# Patient Record
Sex: FEMALE | Race: WHITE | Employment: OTHER | URBAN - METROPOLITAN AREA
[De-identification: names, ages, dates, MRNs, and addresses within clinical notes are randomized per-mention and may not be internally consistent; named-entity substitution may affect disease eponyms.]

---

## 2018-02-16 ENCOUNTER — OFFICE VISIT (OUTPATIENT)
Dept: OBGYN CLINIC | Age: 68
End: 2018-02-16
Payer: MEDICARE

## 2018-02-16 VITALS
DIASTOLIC BLOOD PRESSURE: 80 MMHG | WEIGHT: 208 LBS | HEIGHT: 68 IN | BODY MASS INDEX: 31.52 KG/M2 | SYSTOLIC BLOOD PRESSURE: 124 MMHG

## 2018-02-16 DIAGNOSIS — Z01.419 WELL WOMAN EXAM WITH ROUTINE GYNECOLOGICAL EXAM: Primary | ICD-10-CM

## 2018-02-16 DIAGNOSIS — Z11.51 SCREENING FOR HPV (HUMAN PAPILLOMAVIRUS): ICD-10-CM

## 2018-02-16 DIAGNOSIS — N89.8 VAGINAL ODOR: ICD-10-CM

## 2018-02-16 DIAGNOSIS — R87.612 LGSIL OF CERVIX OF UNDETERMINED SIGNIFICANCE: ICD-10-CM

## 2018-02-16 DIAGNOSIS — N89.8 VAGINAL ITCHING: ICD-10-CM

## 2018-02-16 PROCEDURE — 99214 OFFICE O/P EST MOD 30 MIN: CPT | Performed by: OBSTETRICS & GYNECOLOGY

## 2018-02-16 RX ORDER — CLOBETASOL PROPIONATE 0.5 MG/G
OINTMENT TOPICAL
Qty: 1 TUBE | Refills: 5 | Status: SHIPPED | OUTPATIENT
Start: 2018-02-16

## 2018-02-26 DIAGNOSIS — N89.8 VAGINAL ITCHING: ICD-10-CM

## 2018-02-26 DIAGNOSIS — N89.8 VAGINAL ODOR: ICD-10-CM

## 2018-02-26 DIAGNOSIS — Z11.51 SCREENING FOR HPV (HUMAN PAPILLOMAVIRUS): ICD-10-CM

## 2018-02-26 DIAGNOSIS — R87.612 LGSIL OF CERVIX OF UNDETERMINED SIGNIFICANCE: ICD-10-CM

## 2018-02-26 DIAGNOSIS — Z01.419 WELL WOMAN EXAM WITH ROUTINE GYNECOLOGICAL EXAM: ICD-10-CM

## 2018-02-27 ENCOUNTER — TELEPHONE (OUTPATIENT)
Dept: OBGYN CLINIC | Age: 68
End: 2018-02-27

## 2018-03-13 ENCOUNTER — PROCEDURE VISIT (OUTPATIENT)
Dept: OBGYN CLINIC | Age: 68
End: 2018-03-13
Payer: MEDICARE

## 2018-03-13 DIAGNOSIS — R87.612 LGSIL OF CERVIX OF UNDETERMINED SIGNIFICANCE: Primary | ICD-10-CM

## 2018-03-13 PROCEDURE — 99999 PR OFFICE/OUTPT VISIT,PROCEDURE ONLY: CPT | Performed by: OBSTETRICS & GYNECOLOGY

## 2018-03-13 PROCEDURE — 57454 BX/CURETT OF CERVIX W/SCOPE: CPT | Performed by: OBSTETRICS & GYNECOLOGY

## 2018-09-06 ENCOUNTER — OFFICE VISIT (OUTPATIENT)
Dept: OBGYN CLINIC | Age: 68
End: 2018-09-06
Payer: MEDICARE

## 2018-09-06 VITALS
DIASTOLIC BLOOD PRESSURE: 78 MMHG | SYSTOLIC BLOOD PRESSURE: 126 MMHG | BODY MASS INDEX: 31.22 KG/M2 | WEIGHT: 206 LBS | HEIGHT: 68 IN

## 2018-09-06 DIAGNOSIS — R87.612 LGSIL OF CERVIX OF UNDETERMINED SIGNIFICANCE: Primary | ICD-10-CM

## 2018-09-06 DIAGNOSIS — B97.7 HPV IN FEMALE: ICD-10-CM

## 2018-09-06 PROCEDURE — 99213 OFFICE O/P EST LOW 20 MIN: CPT | Performed by: OBSTETRICS & GYNECOLOGY

## 2018-09-18 DIAGNOSIS — B97.7 HPV IN FEMALE: ICD-10-CM

## 2018-09-18 DIAGNOSIS — R87.612 LGSIL OF CERVIX OF UNDETERMINED SIGNIFICANCE: ICD-10-CM

## 2019-03-11 ENCOUNTER — OFFICE VISIT (OUTPATIENT)
Dept: OBGYN CLINIC | Age: 69
End: 2019-03-11
Payer: MEDICARE

## 2019-03-11 VITALS
SYSTOLIC BLOOD PRESSURE: 94 MMHG | HEIGHT: 68 IN | DIASTOLIC BLOOD PRESSURE: 68 MMHG | BODY MASS INDEX: 32.43 KG/M2 | WEIGHT: 214 LBS

## 2019-03-11 DIAGNOSIS — Z01.419 WELL WOMAN EXAM WITH ROUTINE GYNECOLOGICAL EXAM: Primary | ICD-10-CM

## 2019-03-11 DIAGNOSIS — B97.7 HPV IN FEMALE: ICD-10-CM

## 2019-03-11 DIAGNOSIS — R87.612 LGSIL OF CERVIX OF UNDETERMINED SIGNIFICANCE: ICD-10-CM

## 2019-03-11 DIAGNOSIS — Z11.51 SCREENING FOR HPV (HUMAN PAPILLOMAVIRUS): ICD-10-CM

## 2019-03-11 PROCEDURE — 99397 PER PM REEVAL EST PAT 65+ YR: CPT | Performed by: OBSTETRICS & GYNECOLOGY

## 2019-03-11 ASSESSMENT — PATIENT HEALTH QUESTIONNAIRE - PHQ9
1. LITTLE INTEREST OR PLEASURE IN DOING THINGS: 0
SUM OF ALL RESPONSES TO PHQ QUESTIONS 1-9: 0
SUM OF ALL RESPONSES TO PHQ9 QUESTIONS 1 & 2: 0
2. FEELING DOWN, DEPRESSED OR HOPELESS: 0
SUM OF ALL RESPONSES TO PHQ QUESTIONS 1-9: 0

## 2019-03-22 ENCOUNTER — TELEPHONE (OUTPATIENT)
Dept: OBGYN CLINIC | Age: 69
End: 2019-03-22

## 2019-03-22 DIAGNOSIS — B97.7 HPV IN FEMALE: ICD-10-CM

## 2019-03-22 DIAGNOSIS — Z01.419 WELL WOMAN EXAM WITH ROUTINE GYNECOLOGICAL EXAM: ICD-10-CM

## 2019-03-22 DIAGNOSIS — Z11.51 SCREENING FOR HPV (HUMAN PAPILLOMAVIRUS): ICD-10-CM

## 2019-03-22 DIAGNOSIS — R87.612 LGSIL OF CERVIX OF UNDETERMINED SIGNIFICANCE: ICD-10-CM

## 2019-04-10 ENCOUNTER — PROCEDURE VISIT (OUTPATIENT)
Dept: OBGYN CLINIC | Age: 69
End: 2019-04-10
Payer: MEDICARE

## 2019-04-10 VITALS
BODY MASS INDEX: 32.28 KG/M2 | DIASTOLIC BLOOD PRESSURE: 70 MMHG | SYSTOLIC BLOOD PRESSURE: 116 MMHG | WEIGHT: 213 LBS | HEIGHT: 68 IN

## 2019-04-10 DIAGNOSIS — R87.612 LGSIL OF CERVIX OF UNDETERMINED SIGNIFICANCE: Primary | ICD-10-CM

## 2019-04-10 PROCEDURE — 57456 ENDOCERV CURETTAGE W/SCOPE: CPT | Performed by: OBSTETRICS & GYNECOLOGY

## 2019-04-10 NOTE — PROGRESS NOTES
Procedure Note  Informed Consent:  Pt was informed of the pap smear results. Discussion of risks/options/ alternatives re.colposcopy were  presented to the pt. Discussion of the expectations and limitations of the procedure were reviewed. Risks are dependent on the extensiveness of the procedure and include, but are not limited to:bleeding,infection,insufficient sampling or visualization to complete the study and obtain a diagnosis. In addition,  procedures were reviewed. Possible cervical biopsy and endocervical biopsy were reviewed. In addition, possible endometrial biopsy was reviewed. Pt apparently understands and questions were answered to her satisfaction. Concerns were addressed. Pt gives verbal consent to the procedure    Indications:   Pap history: LGSIL HR and HPV 16 positive 3/19. PT with long standing h/o dysplasia. Pt with h/o hysterectomy. Procedure:    Pt was positioned in the dorsal lithotomy position without difficulty. Speculum was inserted without difficulty. Vagina was adequately visualized with speculum and colposcope. Findings:   Atrophic changes, no acetowhite lesions noted. Biopsies:  Vag cuff - cytobrushes x 3    Review of findings and possible follow up were reviewed. Pt was advised to refrain from vaginal intercourse for 2Days. Pt was advised to expect discharge from 2 to 7 days. Pt advised to call office if increase in bleeding , temp elevation, or change in discharge. If pt experiences pelvic pain not relieved with OTC analgesia pt advised to call office.     Follow up:  Pt to return in 6mos for repeat pap

## 2019-04-10 NOTE — PROGRESS NOTES
Chaperone for Intimate Exam    1. Was chaperone offered as part of the rooming process? offered, declined     A timeout was performed immediately prior to the start of the Colposcopy procedure and included the correct patient (two identifiers), correct procedure and correct site(s). Procedure consent and allergies were also verified.

## 2019-09-26 ENCOUNTER — OFFICE VISIT (OUTPATIENT)
Dept: OBGYN CLINIC | Age: 69
End: 2019-09-26
Payer: MEDICARE

## 2019-09-26 VITALS
BODY MASS INDEX: 32.43 KG/M2 | WEIGHT: 214 LBS | HEIGHT: 68 IN | SYSTOLIC BLOOD PRESSURE: 130 MMHG | DIASTOLIC BLOOD PRESSURE: 76 MMHG

## 2019-09-26 DIAGNOSIS — N94.9 VAGINAL BURNING: ICD-10-CM

## 2019-09-26 DIAGNOSIS — N89.8 VAGINAL ITCHING: Primary | ICD-10-CM

## 2019-09-26 LAB
BILIRUBIN, POC: NORMAL
BLOOD URINE, POC: NORMAL
CLARITY, POC: CLEAR
COLOR, POC: NORMAL
GLUCOSE URINE, POC: NORMAL
KETONES, POC: NORMAL
LEUKOCYTE EST, POC: NORMAL
NITRITE, POC: NORMAL
PH, POC: 6
PROTEIN, POC: NORMAL
SPECIFIC GRAVITY, POC: 1.02
UROBILINOGEN, POC: NORMAL

## 2019-09-26 PROCEDURE — 81003 URINALYSIS AUTO W/O SCOPE: CPT | Performed by: OBSTETRICS & GYNECOLOGY

## 2019-09-26 PROCEDURE — 99213 OFFICE O/P EST LOW 20 MIN: CPT | Performed by: OBSTETRICS & GYNECOLOGY

## 2019-09-26 RX ORDER — CLOBETASOL PROPIONATE 0.5 MG/G
OINTMENT TOPICAL
Qty: 1 TUBE | Refills: 2 | Status: SHIPPED | OUTPATIENT
Start: 2019-09-26 | End: 2021-06-03

## 2019-10-24 ENCOUNTER — OFFICE VISIT (OUTPATIENT)
Dept: OBGYN CLINIC | Age: 69
End: 2019-10-24
Payer: MEDICARE

## 2019-10-24 VITALS — BODY MASS INDEX: 32.23 KG/M2 | WEIGHT: 212 LBS | SYSTOLIC BLOOD PRESSURE: 110 MMHG | DIASTOLIC BLOOD PRESSURE: 64 MMHG

## 2019-10-24 DIAGNOSIS — R87.612 LGSIL ON PAP SMEAR OF CERVIX: Primary | ICD-10-CM

## 2019-10-24 DIAGNOSIS — B97.7 HPV IN FEMALE: ICD-10-CM

## 2019-10-24 PROCEDURE — 99213 OFFICE O/P EST LOW 20 MIN: CPT | Performed by: OBSTETRICS & GYNECOLOGY

## 2019-10-24 RX ORDER — NYSTATIN 100000 U/G
CREAM TOPICAL
Qty: 1 TUBE | Refills: 1 | Status: SHIPPED | OUTPATIENT
Start: 2019-10-24

## 2019-10-24 RX ORDER — PREDNISONE 10 MG/1
TABLET ORAL
COMMUNITY
Start: 2019-10-21

## 2019-11-05 DIAGNOSIS — R87.612 LGSIL ON PAP SMEAR OF CERVIX: ICD-10-CM

## 2019-11-05 DIAGNOSIS — B97.7 HPV IN FEMALE: ICD-10-CM

## 2020-06-29 ENCOUNTER — OFFICE VISIT (OUTPATIENT)
Dept: OBGYN CLINIC | Age: 70
End: 2020-06-29
Payer: MEDICARE

## 2020-06-29 VITALS
WEIGHT: 208 LBS | HEIGHT: 68 IN | BODY MASS INDEX: 31.52 KG/M2 | DIASTOLIC BLOOD PRESSURE: 80 MMHG | SYSTOLIC BLOOD PRESSURE: 124 MMHG

## 2020-06-29 PROCEDURE — 99213 OFFICE O/P EST LOW 20 MIN: CPT | Performed by: OBSTETRICS & GYNECOLOGY

## 2020-06-29 ASSESSMENT — PATIENT HEALTH QUESTIONNAIRE - PHQ9
1. LITTLE INTEREST OR PLEASURE IN DOING THINGS: 0
SUM OF ALL RESPONSES TO PHQ QUESTIONS 1-9: 0
2. FEELING DOWN, DEPRESSED OR HOPELESS: 0
SUM OF ALL RESPONSES TO PHQ9 QUESTIONS 1 & 2: 0
SUM OF ALL RESPONSES TO PHQ QUESTIONS 1-9: 0

## 2020-09-11 ENCOUNTER — TELEPHONE (OUTPATIENT)
Dept: OBGYN CLINIC | Age: 70
End: 2020-09-11

## 2020-09-23 ENCOUNTER — PROCEDURE VISIT (OUTPATIENT)
Dept: OBGYN CLINIC | Age: 70
End: 2020-09-23
Payer: MEDICARE

## 2020-09-23 VITALS
BODY MASS INDEX: 31.67 KG/M2 | WEIGHT: 209 LBS | HEIGHT: 68 IN | SYSTOLIC BLOOD PRESSURE: 128 MMHG | DIASTOLIC BLOOD PRESSURE: 82 MMHG

## 2020-09-23 DIAGNOSIS — R87.612 LGSIL OF CERVIX OF UNDETERMINED SIGNIFICANCE: ICD-10-CM

## 2020-09-23 PROCEDURE — 57421 EXAM/BIOPSY OF VAG W/SCOPE: CPT | Performed by: OBSTETRICS & GYNECOLOGY

## 2020-09-23 NOTE — PROGRESS NOTES
Procedure Note  Informed Consent:  Pt was informed of the pap smear results. Discussion of risks/options/ alternatives re.colposcopy were  presented to the pt. Discussion of the expectations and limitations of the procedure were reviewed. Risks are dependent on the extensiveness of the procedure and include, but are not limited to:bleeding,infection,insufficient sampling or visualization to complete the study and obtain a diagnosis. In addition,  procedures were reviewed. Possible cervical biopsy and endocervical biopsy were reviewed. In addition, possible endometrial biopsy was reviewed. Pt apparently understands and questions were answered to her satisfaction. Concerns were addressed. Pt gives verbal consent to the procedure    Indications:   Pap history: LGSIL cannot exclude HGSIL HR and HPV 16 positive, pt with long standing h/o abn paps and pt with h/o hysterectomy. Procedure:    Pt was positioned in the dorsal lithotomy position without difficulty. Speculum was inserted without difficulty. Cervix  was Prepped With 3% acetic acid. Transformation zone was adequately visualized with speculum and colposcope. Findings:   Well healed cuff, atrophic changes, no acetowhite lesions    Biopsies:  Vag cuff - cytobrushes x 3  Excellent hemostasis was noted. Review of findings and possible follow up were reviewed. Pt was advised to refrain from vaginal intercourse for 2Days. Pt was advised to expect discharge from 2 to 7 days. Pt advised to call office if increase in bleeding , temp elevation, or change in discharge. If pt experiences pelvic pain not relieved with OTC analgesia pt advised to call office.     Follow up:  Pt to return in 6mos for repeat pap

## 2020-10-30 ENCOUNTER — TELEPHONE (OUTPATIENT)
Dept: OBGYN CLINIC | Age: 70
End: 2020-10-30

## 2021-04-27 ENCOUNTER — OFFICE VISIT (OUTPATIENT)
Dept: OBGYN CLINIC | Age: 71
End: 2021-04-27
Payer: MEDICARE

## 2021-04-27 VITALS
SYSTOLIC BLOOD PRESSURE: 128 MMHG | HEIGHT: 68 IN | DIASTOLIC BLOOD PRESSURE: 60 MMHG | WEIGHT: 208 LBS | BODY MASS INDEX: 31.52 KG/M2

## 2021-04-27 DIAGNOSIS — R87.612 LGSIL OF CERVIX OF UNDETERMINED SIGNIFICANCE: Primary | ICD-10-CM

## 2021-04-27 DIAGNOSIS — Z12.72 ENCOUNTER FOR SCREENING FOR MALIGNANT NEOPLASM OF VAGINA: ICD-10-CM

## 2021-04-27 PROCEDURE — 99213 OFFICE O/P EST LOW 20 MIN: CPT | Performed by: OBSTETRICS & GYNECOLOGY

## 2021-04-27 RX ORDER — ASPIRIN 325 MG
325 TABLET ORAL 2 TIMES DAILY
COMMUNITY

## 2021-04-27 RX ORDER — OMEPRAZOLE 40 MG/1
CAPSULE, DELAYED RELEASE ORAL
COMMUNITY
Start: 2021-02-15

## 2021-04-27 ASSESSMENT — PATIENT HEALTH QUESTIONNAIRE - PHQ9
1. LITTLE INTEREST OR PLEASURE IN DOING THINGS: 0
2. FEELING DOWN, DEPRESSED OR HOPELESS: 0

## 2021-04-27 NOTE — PROGRESS NOTES
SUBJECTIVE:   65 y.o.  female here for repeat pap. Pt with h/o abnormal pap 7/20 LGSIL HR HPV Positive and colposcopy 9/20 LGSIL HR HPV positive for evaluation. Pt without complaints today. Review of Systems:  General ROS: negative  Psychological ROS: negative  ENT ROS: negative  Endocrine ROS: negative  Respiratory ROS: no cough, shortness of breath, or wheezing  Cardiovascular ROS: no chest pain or dyspnea on exertion  Gastrointestinal ROS: no abdominal pain, change in bowel habits, or black or bloody stools  Genito-Urinary ROS: no dysuria, trouble voiding, or hematuria  Musculoskeletal ROS: negative  Neurological ROS: no TIA or stroke symptoms  Dermatological ROS: negative    OBJECTIVE:   /60   Ht 5' 8\" (1.727 m)   Wt 208 lb (94.3 kg)   LMP 01/01/2011 (Approximate)   BMI 31.63 kg/m²     Physical Exam:  CONSTITUTIONAL: She appears well nourished and developed   NEUROLOGIC: Alert and oriented to time, place and person  LUNGS: Clear to ascultation bilaterally  CVS: regular rate and rhythm  LYMPHATIC: No palpable lymph nodes  ABDOMEN: benign, soft, nontender, no masses. No liver or splenic organomegaly. No evidence of abdominal or inguinal hernia. No indication for occult blood testing  SKIN: normal texture and tone, no lesions    Pelvic Exam:   EFG: normal external genitalia  URETHRAL MEATUS: normal size, no diverticula   URETHRA: normal appearing without diverticula or lesions  BLADDER:  No masses or tenderness  VAGINA: normal rugae, no discharge, well healed cuff, atrophic changes  CERVIX: absent  PERINEUM: normal appearing without lesions or masses    ASSESSMENT:   LGSIL HR HPV positive     PLAN:   Pap with hpv pending  Pt to f/u for repeat pap in 6mos.

## 2021-05-05 DIAGNOSIS — R87.612 LGSIL OF CERVIX OF UNDETERMINED SIGNIFICANCE: ICD-10-CM

## 2021-05-05 DIAGNOSIS — Z12.72 ENCOUNTER FOR SCREENING FOR MALIGNANT NEOPLASM OF VAGINA: ICD-10-CM

## 2021-05-10 ENCOUNTER — TELEPHONE (OUTPATIENT)
Dept: OBGYN CLINIC | Age: 71
End: 2021-05-10

## 2021-06-03 ENCOUNTER — PROCEDURE VISIT (OUTPATIENT)
Dept: OBGYN CLINIC | Age: 71
End: 2021-06-03
Payer: MEDICARE

## 2021-06-03 VITALS
DIASTOLIC BLOOD PRESSURE: 80 MMHG | WEIGHT: 205 LBS | HEIGHT: 68 IN | BODY MASS INDEX: 31.07 KG/M2 | SYSTOLIC BLOOD PRESSURE: 118 MMHG

## 2021-06-03 DIAGNOSIS — R87.613 HSIL (HIGH GRADE SQUAMOUS INTRAEPITHELIAL LESION) ON PAP SMEAR OF CERVIX: Primary | ICD-10-CM

## 2021-06-03 PROCEDURE — 57456 ENDOCERV CURETTAGE W/SCOPE: CPT | Performed by: OBSTETRICS & GYNECOLOGY

## 2021-06-03 NOTE — PROGRESS NOTES
A timeout was performed immediately prior to the start of the Colposcopy procedure and included the correct patient (two identifiers), correct procedure and correct site(s). Procedure consent and allergies were also verified. The patient was asked if she would like a chaperone present for her intimate exam. She  Declined the chaperone.  Isabelle Moran MA

## 2021-06-08 ENCOUNTER — TELEPHONE (OUTPATIENT)
Dept: OBGYN CLINIC | Age: 71
End: 2021-06-08

## 2021-06-08 DIAGNOSIS — R87.613 HSIL (HIGH GRADE SQUAMOUS INTRAEPITHELIAL LESION) ON PAP SMEAR OF CERVIX: Primary | ICD-10-CM

## 2021-06-09 ENCOUNTER — TELEPHONE (OUTPATIENT)
Dept: OBGYN CLINIC | Age: 71
End: 2021-06-09

## 2021-06-09 NOTE — TELEPHONE ENCOUNTER
Spoke to patient and made aware to contact her insurance company to see what gyn oncologist is covered in the area that she is requesting, and Dr Venita Gosselin can enter in a referral.

## 2021-06-09 NOTE — TELEPHONE ENCOUNTER
Pt is being referred to Dr. Kenney Kennedy. She would like to know if is there is a specialist in [place] or [place].

## 2022-06-13 ENCOUNTER — OFFICE VISIT (OUTPATIENT)
Dept: OBGYN CLINIC | Age: 72
End: 2022-06-13
Payer: MEDICARE

## 2022-06-13 VITALS
WEIGHT: 207 LBS | HEIGHT: 68 IN | SYSTOLIC BLOOD PRESSURE: 130 MMHG | BODY MASS INDEX: 31.37 KG/M2 | DIASTOLIC BLOOD PRESSURE: 88 MMHG

## 2022-06-13 DIAGNOSIS — Z12.31 ENCOUNTER FOR MAMMOGRAM TO ESTABLISH BASELINE MAMMOGRAM: ICD-10-CM

## 2022-06-13 DIAGNOSIS — R87.613 HSIL (HIGH GRADE SQUAMOUS INTRAEPITHELIAL LESION) ON PAP SMEAR OF CERVIX: Primary | ICD-10-CM

## 2022-06-13 DIAGNOSIS — Z12.72 ENCOUNTER FOR SCREENING FOR MALIGNANT NEOPLASM OF VAGINA: ICD-10-CM

## 2022-06-13 DIAGNOSIS — B97.7 HPV IN FEMALE: ICD-10-CM

## 2022-06-13 DIAGNOSIS — R87.612 LGSIL OF CERVIX OF UNDETERMINED SIGNIFICANCE: ICD-10-CM

## 2022-06-13 PROCEDURE — 99397 PER PM REEVAL EST PAT 65+ YR: CPT | Performed by: OBSTETRICS & GYNECOLOGY

## 2022-06-13 ASSESSMENT — PATIENT HEALTH QUESTIONNAIRE - PHQ9
SUM OF ALL RESPONSES TO PHQ QUESTIONS 1-9: 0
SUM OF ALL RESPONSES TO PHQ9 QUESTIONS 1 & 2: 0
SUM OF ALL RESPONSES TO PHQ QUESTIONS 1-9: 0
2. FEELING DOWN, DEPRESSED OR HOPELESS: 0
1. LITTLE INTEREST OR PLEASURE IN DOING THINGS: 0

## 2022-06-13 NOTE — PROGRESS NOTES
SUBJECTIVE:   66 y.o.  female here for annual exam. Pt  and no complaints today. PT with VAIN/HGSIL last year and pt seen and treated by Dr. Victor Manuel Alcala last year. Review of Systems:  General ROS: negative  Psychological ROS: negative  ENT ROS: negative  Endocrine ROS: negative  Respiratory ROS: no cough, shortness of breath, or wheezing  Cardiovascular ROS: no chest pain or dyspnea on exertion  Gastrointestinal ROS: no abdominal pain, change in bowel habits, or black or bloody stools  Genito-Urinary ROS: no dysuria, trouble voiding, or hematuria  Musculoskeletal ROS: negative  Neurological ROS: no TIA or stroke symptoms  Dermatological ROS: negative    OBJECTIVE:   /88   Ht 5' 8\" (1.727 m)   Wt 207 lb (93.9 kg)   LMP 2011 (Approximate)   BMI 31.47 kg/m²     Physical Exam:  CONSTITUTIONAL: She appears well nourished and developed   NEUROLOGIC: Alert and oriented to time, place and person  NECK: no thyroidmegaly  BREASTS: Bilateral breasts without masses, lesions or nipple discharge  LUNGS: Clear to ascultation bilaterally  CVS: regular rate and rhythm  LYMPHATIC: No palpable lymph nodes  ABDOMEN: benign, soft, nontender, no masses. No liver or splenic organomegaly. No evidence of abdominal or inguinal hernia. No indication for occult blood testing  SKIN: normal texture and tone, no lesions  NEURO: normal tone, no hyperreflexia, 1+DTRs throughout    Pelvic Exam:   EFG: normal external genitalia  URETHRAL MEATUS: normal size, no diverticula   URETHRA: normal appearing without diverticula or lesions  BLADDER:  No masses or tenderness  VAGINA: normal rugae, no discharge, atrophic changes  CERVIX: absent  UTERUS: absent  ADNEXA: normal adnexa in size, nontender and no masses. PERINEUM: normal appearing without lesions or masses  RECTUM: no hemorrhoids or rectal masses.      ASSESSMENT:   Routine gynecologic exam    PLAN:   Pap with hpv pending  MMG nml per pt  PT reports h/o colonoscopy and will f/u with PCP for repeat as needed  Past medical, social and family history reviewed and updated in pt's chart. Routine health screenings and precautions discussed.

## 2022-06-29 ENCOUNTER — TELEPHONE (OUTPATIENT)
Dept: OBGYN CLINIC | Age: 72
End: 2022-06-29

## 2022-06-29 DIAGNOSIS — R87.613 HSIL (HIGH GRADE SQUAMOUS INTRAEPITHELIAL LESION) ON PAP SMEAR OF CERVIX: Primary | ICD-10-CM

## 2022-06-29 NOTE — TELEPHONE ENCOUNTER
LM to make pt aware that she needs to schedule and apt with Dr Isa Pruitt for HGSIL. Please give call to Isabelle Roa.

## 2022-06-29 NOTE — TELEPHONE ENCOUNTER
Pt aware and will call Dr. Patricio Ardon to schedule apt. New referral and records have been faxed to Dr Patricio Ardon.

## 2023-03-13 DIAGNOSIS — E11.65 TYPE 2 DIABETES MELLITUS WITH HYPERGLYCEMIA, WITHOUT LONG-TERM CURRENT USE OF INSULIN (MULTI): ICD-10-CM

## 2023-03-13 RX ORDER — METFORMIN HYDROCHLORIDE 500 MG/1
500 TABLET ORAL
Qty: 180 TABLET | Refills: 1 | Status: SHIPPED | OUTPATIENT
Start: 2023-03-13 | End: 2023-09-06

## 2023-03-13 RX ORDER — METFORMIN HYDROCHLORIDE 500 MG/1
500 TABLET ORAL
COMMUNITY
Start: 2022-06-23 | End: 2023-03-13 | Stop reason: SDUPTHER

## 2023-03-14 RX ORDER — METFORMIN HYDROCHLORIDE 500 MG/1
TABLET ORAL
Qty: 60 TABLET | Refills: 0 | OUTPATIENT
Start: 2023-03-14

## 2023-03-22 PROBLEM — E78.5 HYPERLIPIDEMIA: Status: ACTIVE | Noted: 2023-03-22

## 2023-03-22 PROBLEM — E11.65 DIABETES MELLITUS WITH HYPERGLYCEMIA, WITHOUT LONG-TERM CURRENT USE OF INSULIN (MULTI): Status: ACTIVE | Noted: 2023-03-22

## 2023-03-22 PROBLEM — I10 BENIGN HYPERTENSION: Status: ACTIVE | Noted: 2023-03-22

## 2023-03-22 PROBLEM — R05.9 COUGH: Status: ACTIVE | Noted: 2023-03-22

## 2023-03-22 PROBLEM — F41.9 ANXIETY: Status: ACTIVE | Noted: 2023-03-22

## 2023-03-22 PROBLEM — G47.00 INSOMNIA, CONTROLLED: Status: ACTIVE | Noted: 2023-03-22

## 2023-03-22 PROBLEM — J00 COMMON COLD: Status: ACTIVE | Noted: 2023-03-22

## 2023-03-22 PROBLEM — J30.2 SEASONAL ALLERGIES: Status: ACTIVE | Noted: 2023-03-22

## 2023-03-22 PROBLEM — J45.909 ASTHMA, MILD (HHS-HCC): Status: ACTIVE | Noted: 2023-03-22

## 2023-03-22 PROBLEM — B00.9 HERPES SIMPLEX TYPE 2 INFECTION: Status: ACTIVE | Noted: 2023-03-22

## 2023-03-22 RX ORDER — ATORVASTATIN CALCIUM 10 MG/1
10 TABLET, FILM COATED ORAL DAILY
COMMUNITY
End: 2023-06-19 | Stop reason: SDUPTHER

## 2023-03-22 RX ORDER — ALBUTEROL SULFATE 90 UG/1
2 AEROSOL, METERED RESPIRATORY (INHALATION) EVERY 4 HOURS PRN
COMMUNITY
Start: 2023-02-20

## 2023-03-22 RX ORDER — FLUTICASONE PROPIONATE 50 MCG
SPRAY, SUSPENSION (ML) NASAL
COMMUNITY

## 2023-03-22 RX ORDER — MULTIVITAMIN
TABLET ORAL
COMMUNITY

## 2023-03-22 RX ORDER — CHOLECALCIFEROL (VITAMIN D3) 50 MCG
TABLET ORAL
COMMUNITY

## 2023-03-22 RX ORDER — LANCETS
EACH MISCELLANEOUS
COMMUNITY

## 2023-03-22 RX ORDER — BISOPROLOL FUMARATE AND HYDROCHLOROTHIAZIDE 5; 6.25 MG/1; MG/1
1 TABLET ORAL DAILY
COMMUNITY
End: 2023-06-19 | Stop reason: SDUPTHER

## 2023-03-22 RX ORDER — CITALOPRAM 20 MG/1
20 TABLET, FILM COATED ORAL DAILY
COMMUNITY
End: 2023-06-19 | Stop reason: SDUPTHER

## 2023-03-22 RX ORDER — PANTOPRAZOLE SODIUM 40 MG/1
1 TABLET, DELAYED RELEASE ORAL DAILY
COMMUNITY
Start: 2022-02-28

## 2023-03-22 RX ORDER — BENZONATATE 100 MG/1
100 CAPSULE ORAL EVERY 8 HOURS PRN
COMMUNITY
Start: 2023-01-29 | End: 2023-04-03 | Stop reason: ALTCHOICE

## 2023-03-22 RX ORDER — IBUPROFEN 200 MG
CAPSULE ORAL
COMMUNITY
End: 2023-12-05 | Stop reason: SDUPTHER

## 2023-03-24 ENCOUNTER — APPOINTMENT (OUTPATIENT)
Dept: PRIMARY CARE | Facility: CLINIC | Age: 73
End: 2023-03-24
Payer: MEDICARE

## 2023-04-03 ENCOUNTER — OFFICE VISIT (OUTPATIENT)
Dept: PRIMARY CARE | Facility: CLINIC | Age: 73
End: 2023-04-03
Payer: MEDICARE

## 2023-04-03 VITALS
HEART RATE: 64 BPM | BODY MASS INDEX: 28.34 KG/M2 | DIASTOLIC BLOOD PRESSURE: 80 MMHG | HEIGHT: 68 IN | WEIGHT: 187 LBS | SYSTOLIC BLOOD PRESSURE: 130 MMHG

## 2023-04-03 DIAGNOSIS — Z23 NEED FOR VACCINATION: ICD-10-CM

## 2023-04-03 DIAGNOSIS — Z00.00 ROUTINE GENERAL MEDICAL EXAMINATION AT HEALTH CARE FACILITY: Primary | ICD-10-CM

## 2023-04-03 DIAGNOSIS — R07.81 RIB PAIN ON RIGHT SIDE: ICD-10-CM

## 2023-04-03 PROCEDURE — 1160F RVW MEDS BY RX/DR IN RCRD: CPT | Performed by: FAMILY MEDICINE

## 2023-04-03 PROCEDURE — 90677 PCV20 VACCINE IM: CPT | Performed by: FAMILY MEDICINE

## 2023-04-03 PROCEDURE — G0439 PPPS, SUBSEQ VISIT: HCPCS | Performed by: FAMILY MEDICINE

## 2023-04-03 PROCEDURE — 1159F MED LIST DOCD IN RCRD: CPT | Performed by: FAMILY MEDICINE

## 2023-04-03 PROCEDURE — 3079F DIAST BP 80-89 MM HG: CPT | Performed by: FAMILY MEDICINE

## 2023-04-03 PROCEDURE — G0009 ADMIN PNEUMOCOCCAL VACCINE: HCPCS | Performed by: FAMILY MEDICINE

## 2023-04-03 PROCEDURE — 99213 OFFICE O/P EST LOW 20 MIN: CPT | Performed by: FAMILY MEDICINE

## 2023-04-03 PROCEDURE — 3075F SYST BP GE 130 - 139MM HG: CPT | Performed by: FAMILY MEDICINE

## 2023-04-03 PROCEDURE — 1170F FXNL STATUS ASSESSED: CPT | Performed by: FAMILY MEDICINE

## 2023-04-03 PROCEDURE — 1123F ACP DISCUSS/DSCN MKR DOCD: CPT | Performed by: FAMILY MEDICINE

## 2023-04-03 PROCEDURE — 1036F TOBACCO NON-USER: CPT | Performed by: FAMILY MEDICINE

## 2023-04-03 RX ORDER — ESTRADIOL 0.1 MG/G
2 CREAM VAGINAL DAILY
COMMUNITY
End: 2023-12-05 | Stop reason: WASHOUT

## 2023-04-03 ASSESSMENT — PATIENT HEALTH QUESTIONNAIRE - PHQ9
2. FEELING DOWN, DEPRESSED OR HOPELESS: NOT AT ALL
1. LITTLE INTEREST OR PLEASURE IN DOING THINGS: NOT AT ALL
SUM OF ALL RESPONSES TO PHQ9 QUESTIONS 1 AND 2: 0

## 2023-04-03 ASSESSMENT — ACTIVITIES OF DAILY LIVING (ADL)
DRESSING: INDEPENDENT
TAKING_MEDICATION: INDEPENDENT
MANAGING_FINANCES: INDEPENDENT
BATHING: INDEPENDENT
GROCERY_SHOPPING: INDEPENDENT
DOING_HOUSEWORK: INDEPENDENT

## 2023-04-03 NOTE — PATIENT INSTRUCTIONS
Followup as scheduled in June.    Will start with xrays of the area and let you know the results, go from there.  Call concerns.

## 2023-04-03 NOTE — PROGRESS NOTES
Windy Venegas is a 72 y.o. female who presents for Lump RT underarm.  MALU Mckeon presents with spouse Frankie  For about a month ahs had a pain with swelling, can feel a lump or two in right rib axillary line area.  She saw her GYN and had right diagnostic mammogram that came back okay.  No current cough, no current fevers/chills, no shortness of breath.  She did recently move and did a lot of packing and lifting.  She also had a recent respiratory illness with significant coughing.    Review of Systems   All other systems reviewed and are negative.  .    Objective     Visit Vitals  /80 (BP Location: Left arm, Patient Position: Sitting)   Pulse 64      Physical Exam  Vitals and nursing note reviewed.   Constitutional:       General: She is not in acute distress.     Appearance: Normal appearance. She is not toxic-appearing.   HENT:      Head: Normocephalic and atraumatic.   Cardiovascular:      Heart sounds: No murmur heard.  Pulmonary:      Effort: Pulmonary effort is normal.   Musculoskeletal:      Cervical back: Neck supple. No rigidity.      Comments: Right axillary line rib area, tender, no bruising or discoloration, no mass palpated    Skin:     General: Skin is warm and dry.   Neurological:      General: No focal deficit present.      Mental Status: She is alert and oriented to person, place, and time.   Psychiatric:         Mood and Affect: Mood normal.         Behavior: Behavior normal.         Assessment/Plan   Problem List Items Addressed This Visit    None  Visit Diagnoses       Routine general medical examination at health care facility    -  Primary    Need for vaccination        Relevant Orders    Pneumococcal conjugate vaccine 20-valent IM    Rib pain on right side        Relevant Orders    XR ribs right 2 views w chest anteroposterior                   Deonna Guzman MD

## 2023-04-03 NOTE — PROGRESS NOTES
Lump rt armpit x 2mo; has been doing a lot of lifting lately while moving; saw GYN and they ordered xray of breast and that came back normal

## 2023-04-04 ENCOUNTER — TELEPHONE (OUTPATIENT)
Dept: PRIMARY CARE | Facility: CLINIC | Age: 73
End: 2023-04-04
Payer: MEDICARE

## 2023-04-04 NOTE — TELEPHONE ENCOUNTER
----- Message from Deonna Guzman MD sent at 4/4/2023  1:06 PM EDT -----  Her rib xrays came back okay, no fractures or anything worrisome.  How is she feeling?   If doesn't improve can try to get ct scan approved by insurance, most likely issue is muscular sprain/strain with some spasm, but that should improve.

## 2023-04-10 ENCOUNTER — TELEPHONE (OUTPATIENT)
Dept: PRIMARY CARE | Facility: CLINIC | Age: 73
End: 2023-04-10
Payer: MEDICARE

## 2023-04-10 NOTE — TELEPHONE ENCOUNTER
"Done pt notified; copy of CT mailed to pt. Wonders if JOGUERRERO could order testing to check her gallbladder states \"there's definitely something going on\". Please advise, thanks.  "

## 2023-04-10 NOTE — TELEPHONE ENCOUNTER
----- Message from Deonna Guzman MD sent at 4/10/2023  8:22 AM EDT -----  Can you please let her know her ct report came back fine with no worrisome findings.  We can mail her a ocpy if shed like.

## 2023-04-10 NOTE — TELEPHONE ENCOUNTER
I going to recommend she schedule a follow up appt.  What we discuss at her previous visit would not cover gallbladder testing with insurance, so lets make sure we have everything updated and accurate as far as her symptoms and go from there.   Attending Only

## 2023-04-19 ENCOUNTER — LAB (OUTPATIENT)
Dept: LAB | Facility: LAB | Age: 73
End: 2023-04-19
Payer: MEDICARE

## 2023-04-19 ENCOUNTER — TELEPHONE (OUTPATIENT)
Dept: PRIMARY CARE | Facility: CLINIC | Age: 73
End: 2023-04-19

## 2023-04-19 ENCOUNTER — OFFICE VISIT (OUTPATIENT)
Dept: PRIMARY CARE | Facility: CLINIC | Age: 73
End: 2023-04-19
Payer: MEDICARE

## 2023-04-19 VITALS
BODY MASS INDEX: 28.36 KG/M2 | DIASTOLIC BLOOD PRESSURE: 78 MMHG | SYSTOLIC BLOOD PRESSURE: 134 MMHG | HEART RATE: 68 BPM | WEIGHT: 187.1 LBS | HEIGHT: 68 IN

## 2023-04-19 DIAGNOSIS — R11.0 POSTPRANDIAL NAUSEA: ICD-10-CM

## 2023-04-19 DIAGNOSIS — E11.65 TYPE 2 DIABETES MELLITUS WITH HYPERGLYCEMIA, WITHOUT LONG-TERM CURRENT USE OF INSULIN (MULTI): ICD-10-CM

## 2023-04-19 DIAGNOSIS — K82.8 DYSFUNCTIONAL GALLBLADDER: ICD-10-CM

## 2023-04-19 DIAGNOSIS — R11.0 POSTPRANDIAL NAUSEA: Primary | ICD-10-CM

## 2023-04-19 PROBLEM — R05.9 COUGH: Status: RESOLVED | Noted: 2023-03-22 | Resolved: 2023-04-19

## 2023-04-19 PROBLEM — J00 COMMON COLD: Status: RESOLVED | Noted: 2023-03-22 | Resolved: 2023-04-19

## 2023-04-19 LAB
ALANINE AMINOTRANSFERASE (SGPT) (U/L) IN SER/PLAS: 18 U/L (ref 7–45)
ALBUMIN (G/DL) IN SER/PLAS: 4.3 G/DL (ref 3.4–5)
ALKALINE PHOSPHATASE (U/L) IN SER/PLAS: 82 U/L (ref 33–136)
AMYLASE (U/L) IN SER/PLAS: 41 U/L (ref 29–103)
ANION GAP IN SER/PLAS: 9 MMOL/L (ref 10–20)
ASPARTATE AMINOTRANSFERASE (SGOT) (U/L) IN SER/PLAS: 22 U/L (ref 9–39)
BASOPHILS (10*3/UL) IN BLOOD BY AUTOMATED COUNT: 0.09 X10E9/L (ref 0–0.1)
BASOPHILS/100 LEUKOCYTES IN BLOOD BY AUTOMATED COUNT: 1.5 % (ref 0–2)
BILIRUBIN TOTAL (MG/DL) IN SER/PLAS: 0.7 MG/DL (ref 0–1.2)
CALCIUM (MG/DL) IN SER/PLAS: 9.6 MG/DL (ref 8.6–10.3)
CARBON DIOXIDE, TOTAL (MMOL/L) IN SER/PLAS: 32 MMOL/L (ref 21–32)
CHLORIDE (MMOL/L) IN SER/PLAS: 105 MMOL/L (ref 98–107)
CREATININE (MG/DL) IN SER/PLAS: 0.7 MG/DL (ref 0.5–1.05)
EOSINOPHILS (10*3/UL) IN BLOOD BY AUTOMATED COUNT: 0.12 X10E9/L (ref 0–0.4)
EOSINOPHILS/100 LEUKOCYTES IN BLOOD BY AUTOMATED COUNT: 2 % (ref 0–6)
ERYTHROCYTE DISTRIBUTION WIDTH (RATIO) BY AUTOMATED COUNT: 12.1 % (ref 11.5–14.5)
ERYTHROCYTE MEAN CORPUSCULAR HEMOGLOBIN CONCENTRATION (G/DL) BY AUTOMATED: 32.5 G/DL (ref 32–36)
ERYTHROCYTE MEAN CORPUSCULAR VOLUME (FL) BY AUTOMATED COUNT: 96 FL (ref 80–100)
ERYTHROCYTES (10*6/UL) IN BLOOD BY AUTOMATED COUNT: 4.41 X10E12/L (ref 4–5.2)
GFR FEMALE: 90 ML/MIN/1.73M2
GLUCOSE (MG/DL) IN SER/PLAS: 144 MG/DL (ref 74–99)
HEMATOCRIT (%) IN BLOOD BY AUTOMATED COUNT: 42.2 % (ref 36–46)
HEMOGLOBIN (G/DL) IN BLOOD: 13.7 G/DL (ref 12–16)
IMMATURE GRANULOCYTES/100 LEUKOCYTES IN BLOOD BY AUTOMATED COUNT: 0 % (ref 0–0.9)
LEUKOCYTES (10*3/UL) IN BLOOD BY AUTOMATED COUNT: 5.9 X10E9/L (ref 4.4–11.3)
LYMPHOCYTES (10*3/UL) IN BLOOD BY AUTOMATED COUNT: 2.17 X10E9/L (ref 0.8–3)
LYMPHOCYTES/100 LEUKOCYTES IN BLOOD BY AUTOMATED COUNT: 36.9 % (ref 13–44)
MONOCYTES (10*3/UL) IN BLOOD BY AUTOMATED COUNT: 0.42 X10E9/L (ref 0.05–0.8)
MONOCYTES/100 LEUKOCYTES IN BLOOD BY AUTOMATED COUNT: 7.1 % (ref 2–10)
NEUTROPHILS (10*3/UL) IN BLOOD BY AUTOMATED COUNT: 3.08 X10E9/L (ref 1.6–5.5)
NEUTROPHILS/100 LEUKOCYTES IN BLOOD BY AUTOMATED COUNT: 52.5 % (ref 40–80)
PLATELETS (10*3/UL) IN BLOOD AUTOMATED COUNT: 185 X10E9/L (ref 150–450)
POTASSIUM (MMOL/L) IN SER/PLAS: 4.3 MMOL/L (ref 3.5–5.3)
PROTEIN TOTAL: 6.4 G/DL (ref 6.4–8.2)
SODIUM (MMOL/L) IN SER/PLAS: 142 MMOL/L (ref 136–145)
UREA NITROGEN (MG/DL) IN SER/PLAS: 16 MG/DL (ref 6–23)

## 2023-04-19 PROCEDURE — 1159F MED LIST DOCD IN RCRD: CPT | Performed by: FAMILY MEDICINE

## 2023-04-19 PROCEDURE — 3075F SYST BP GE 130 - 139MM HG: CPT | Performed by: FAMILY MEDICINE

## 2023-04-19 PROCEDURE — 99214 OFFICE O/P EST MOD 30 MIN: CPT | Performed by: FAMILY MEDICINE

## 2023-04-19 PROCEDURE — 85025 COMPLETE CBC W/AUTO DIFF WBC: CPT

## 2023-04-19 PROCEDURE — 3078F DIAST BP <80 MM HG: CPT | Performed by: FAMILY MEDICINE

## 2023-04-19 PROCEDURE — 36415 COLL VENOUS BLD VENIPUNCTURE: CPT

## 2023-04-19 PROCEDURE — 82150 ASSAY OF AMYLASE: CPT

## 2023-04-19 PROCEDURE — 80053 COMPREHEN METABOLIC PANEL: CPT

## 2023-04-19 PROCEDURE — 1036F TOBACCO NON-USER: CPT | Performed by: FAMILY MEDICINE

## 2023-04-19 PROCEDURE — 1160F RVW MEDS BY RX/DR IN RCRD: CPT | Performed by: FAMILY MEDICINE

## 2023-04-19 ASSESSMENT — ENCOUNTER SYMPTOMS
BREAST PAIN: 1
DIARRHEA: 1

## 2023-04-19 NOTE — PROGRESS NOTES
Subjective  Linda Venegas is a 72 y.o. female who presents for Follow Ct, Diarrhea, and Breast Pain.  Diarrhea     Breast Pain  Associated Symptoms: breast pain      Linda presents today accompanied by her  Frankie.  Followup right side/rib pain.  Was seen 4/3 for right axillary line rib pain,swelling . Had normal diagnostic mammo previously with gyn.  Had rib xrays and then chest ct with no diagnostic or worrisome findings.  Still having pain in right side, axillary line, worse when lying on it.  No shortness of breath, pain unchangd with eating, unchanged with deep breath, unchanged with exertion . Always present, occasionally takes two tylenol. She has had some post prandial nausea, worse with fatty foods  Has intermittent chronic diarrhea, states that is not new.  Occasionally has right shoulder pain, and occasionally pain in lower abdomen. Had recent respiratory illness with significant cough.   She states shes been doing some research with the [facility], and wonders if it could be her gallbladder.  It wouldn't be the classic presentation, but we can certainly check it. Also consider thoracic neuralgia, or intercostal sprain .  No rash, no vesicles.     Review of Systems   Gastrointestinal:  Positive for diarrhea.   All other systems reviewed and are negative.  .    Objective     Visit Vitals  /78 (BP Location: Left arm, Patient Position: Sitting)   Pulse 68      Physical Exam  Eyes:      General: No scleral icterus.  Pulmonary:      Effort: Pulmonary effort is normal.   Abdominal:      General: Abdomen is flat.      Palpations: Abdomen is soft.      Tenderness: There is no abdominal tenderness. There is no right CVA tenderness.   Musculoskeletal:      Comments: No vesicles, no tenderness /rib ain         Assessment/Plan   Problem List Items Addressed This Visit          Endocrine/Metabolic    Diabetes mellitus with hyperglycemia, without long-term current use of insulin (CMS/[facility])      Other Visit Diagnoses       Postprandial nausea    -  Primary    Relevant Orders    US gallbladder    CBC and Auto Differential    Comprehensive Metabolic Panel    Amylase                   Deonna Guzman MD

## 2023-04-20 ENCOUNTER — TELEPHONE (OUTPATIENT)
Dept: PRIMARY CARE | Facility: CLINIC | Age: 73
End: 2023-04-20
Payer: MEDICARE

## 2023-04-20 NOTE — TELEPHONE ENCOUNTER
----- Message from Deonna Guzman MD sent at 4/19/2023  1:52 PM EDT -----  Can you let her know her labs look fine, I'll lether know when I get the ultrasound report back

## 2023-05-01 NOTE — PROGRESS NOTES
PRIOR AUTH DONE. ORDER ROUTED TO [facility] TO SCHEDULE WITH PATIENT.

## 2023-06-05 ENCOUNTER — OFFICE VISIT (OUTPATIENT)
Dept: PRIMARY CARE | Facility: CLINIC | Age: 73
End: 2023-06-05
Payer: MEDICARE

## 2023-06-05 VITALS
SYSTOLIC BLOOD PRESSURE: 124 MMHG | DIASTOLIC BLOOD PRESSURE: 72 MMHG | HEART RATE: 76 BPM | HEIGHT: 68 IN | WEIGHT: 183 LBS | BODY MASS INDEX: 27.74 KG/M2

## 2023-06-05 DIAGNOSIS — K82.8 DYSFUNCTIONAL GALLBLADDER: ICD-10-CM

## 2023-06-05 DIAGNOSIS — E78.49 OTHER HYPERLIPIDEMIA: ICD-10-CM

## 2023-06-05 DIAGNOSIS — E11.65 TYPE 2 DIABETES MELLITUS WITH HYPERGLYCEMIA, WITHOUT LONG-TERM CURRENT USE OF INSULIN (MULTI): Primary | ICD-10-CM

## 2023-06-05 DIAGNOSIS — J00 ACUTE NASOPHARYNGITIS: ICD-10-CM

## 2023-06-05 DIAGNOSIS — I10 BENIGN HYPERTENSION: ICD-10-CM

## 2023-06-05 LAB
ANION GAP IN SER/PLAS: 9 MMOL/L (ref 10–20)
CALCIUM (MG/DL) IN SER/PLAS: 9.4 MG/DL (ref 8.6–10.3)
CARBON DIOXIDE, TOTAL (MMOL/L) IN SER/PLAS: 30 MMOL/L (ref 21–32)
CHLORIDE (MMOL/L) IN SER/PLAS: 104 MMOL/L (ref 98–107)
CREATININE (MG/DL) IN SER/PLAS: 0.67 MG/DL (ref 0.5–1.05)
ESTIMATED AVERAGE GLUCOSE FOR HBA1C: 140 MG/DL
GFR FEMALE: >90 ML/MIN/1.73M2
GLUCOSE (MG/DL) IN SER/PLAS: 102 MG/DL (ref 74–99)
HEMOGLOBIN A1C/HEMOGLOBIN TOTAL IN BLOOD: 6.5 %
POTASSIUM (MMOL/L) IN SER/PLAS: 4.4 MMOL/L (ref 3.5–5.3)
SODIUM (MMOL/L) IN SER/PLAS: 139 MMOL/L (ref 136–145)
UREA NITROGEN (MG/DL) IN SER/PLAS: 14 MG/DL (ref 6–23)

## 2023-06-05 PROCEDURE — 1036F TOBACCO NON-USER: CPT | Performed by: FAMILY MEDICINE

## 2023-06-05 PROCEDURE — 3074F SYST BP LT 130 MM HG: CPT | Performed by: FAMILY MEDICINE

## 2023-06-05 PROCEDURE — 1160F RVW MEDS BY RX/DR IN RCRD: CPT | Performed by: FAMILY MEDICINE

## 2023-06-05 PROCEDURE — 99214 OFFICE O/P EST MOD 30 MIN: CPT | Performed by: FAMILY MEDICINE

## 2023-06-05 PROCEDURE — 1159F MED LIST DOCD IN RCRD: CPT | Performed by: FAMILY MEDICINE

## 2023-06-05 PROCEDURE — 3078F DIAST BP <80 MM HG: CPT | Performed by: FAMILY MEDICINE

## 2023-06-05 NOTE — PROGRESS NOTES
Patient presents for periodic surveillance of chronic medical problems.     Subjective  Lindacamille Venegas is a 72 y.o. female who presents for Annual Exam.  HPI  Linda here with  Frankie.  DM, due to reassess, getting labs this morning, has been checking glucoses, 103,115  Htn, stable  Hyperlipidemia on statin  She and Frankie had very bad cold, getting better but strength not back.  She had yellow phlegm but is having less of that.  In bed for two weeks ,no fevers.  Still doesn't have strength back to normal.  Ate much less when sick.  Gallbladder dysfunction, is going to cancel surgical eval as if she eats differently she doesn't have the pain    Review of Systems   All other systems reviewed and are negative.  .    Objective     Visit Vitals  /72 (BP Location: Left arm, Patient Position: Sitting)   Pulse 76      Physical Exam  Vitals and nursing note reviewed.   Constitutional:       General: She is not in acute distress.     Appearance: Normal appearance. She is not toxic-appearing.   HENT:      Head: Normocephalic and atraumatic.   Cardiovascular:      Rate and Rhythm: Normal rate and regular rhythm.      Heart sounds: No murmur heard.  Pulmonary:      Effort: Pulmonary effort is normal.      Breath sounds: Normal breath sounds.   Musculoskeletal:      Comments:     Skin:     General: Skin is warm and dry.   Neurological:      General: No focal deficit present.      Mental Status: She is alert and oriented to person, place, and time.   Psychiatric:         Mood and Affect: Mood normal.         Behavior: Behavior normal.         Assessment/Plan   Problem List Items Addressed This Visit          Circulatory    Benign hypertension       Endocrine/Metabolic    Diabetes mellitus with hyperglycemia, without long-term current use of insulin (CMS/[facility]) - Primary    Relevant Orders    Follow Up In Primary Care    CBC and Auto Differential    Comprehensive Metabolic Panel    Lipid Panel    TSH with reflex to Free  T4 if abnormal    Urinalysis Microscopic Only    Vitamin B12    Albumin , Urine Random    Hemoglobin A1C       Other    Hyperlipidemia     Other Visit Diagnoses       Acute nasopharyngitis        Dysfunctional gallbladder                       Deonna Guzman MD

## 2023-06-05 NOTE — PROGRESS NOTES
Med check; currently on Clindamycin 150mg BID x 10D for a tooth abscess; states on 5-19-23 got sick and was in bed for 2wks; cough and congestion

## 2023-06-19 DIAGNOSIS — E78.49 OTHER HYPERLIPIDEMIA: ICD-10-CM

## 2023-06-19 DIAGNOSIS — I10 BENIGN HYPERTENSION: ICD-10-CM

## 2023-06-19 DIAGNOSIS — F41.9 ANXIETY: ICD-10-CM

## 2023-06-19 RX ORDER — BISOPROLOL FUMARATE AND HYDROCHLOROTHIAZIDE 5; 6.25 MG/1; MG/1
1 TABLET ORAL DAILY
Qty: 90 TABLET | Refills: 1 | Status: SHIPPED | OUTPATIENT
Start: 2023-06-19 | End: 2023-12-11

## 2023-06-19 RX ORDER — CITALOPRAM 20 MG/1
20 TABLET, FILM COATED ORAL DAILY
Qty: 90 TABLET | Refills: 1 | Status: SHIPPED | OUTPATIENT
Start: 2023-06-19 | End: 2023-12-11

## 2023-06-19 RX ORDER — ATORVASTATIN CALCIUM 10 MG/1
10 TABLET, FILM COATED ORAL DAILY
Qty: 90 TABLET | Refills: 1 | Status: SHIPPED | OUTPATIENT
Start: 2023-06-19 | End: 2023-12-11

## 2023-08-24 ENCOUNTER — OFFICE VISIT (OUTPATIENT)
Dept: PRIMARY CARE | Facility: CLINIC | Age: 73
End: 2023-08-24
Payer: MEDICARE

## 2023-08-24 VITALS
BODY MASS INDEX: 29.22 KG/M2 | WEIGHT: 192.8 LBS | HEART RATE: 76 BPM | SYSTOLIC BLOOD PRESSURE: 134 MMHG | HEIGHT: 68 IN | DIASTOLIC BLOOD PRESSURE: 82 MMHG

## 2023-08-24 DIAGNOSIS — J45.909 MILD ASTHMA, UNSPECIFIED WHETHER COMPLICATED, UNSPECIFIED WHETHER PERSISTENT (HHS-HCC): ICD-10-CM

## 2023-08-24 DIAGNOSIS — B37.2 CANDIDAL INTERTRIGO: Primary | ICD-10-CM

## 2023-08-24 PROCEDURE — 3075F SYST BP GE 130 - 139MM HG: CPT | Performed by: FAMILY MEDICINE

## 2023-08-24 PROCEDURE — 3079F DIAST BP 80-89 MM HG: CPT | Performed by: FAMILY MEDICINE

## 2023-08-24 PROCEDURE — 1159F MED LIST DOCD IN RCRD: CPT | Performed by: FAMILY MEDICINE

## 2023-08-24 PROCEDURE — 99213 OFFICE O/P EST LOW 20 MIN: CPT | Performed by: FAMILY MEDICINE

## 2023-08-24 PROCEDURE — 1036F TOBACCO NON-USER: CPT | Performed by: FAMILY MEDICINE

## 2023-08-24 PROCEDURE — 1160F RVW MEDS BY RX/DR IN RCRD: CPT | Performed by: FAMILY MEDICINE

## 2023-08-24 PROCEDURE — 3044F HG A1C LEVEL LT 7.0%: CPT | Performed by: FAMILY MEDICINE

## 2023-08-24 RX ORDER — NYSTATIN 100000 U/G
CREAM TOPICAL 3 TIMES DAILY
Qty: 30 G | Refills: 3 | Status: SHIPPED | OUTPATIENT
Start: 2023-08-24 | End: 2023-12-05

## 2023-08-24 RX ORDER — NYSTATIN 100000 [USP'U]/G
POWDER TOPICAL 2 TIMES DAILY
Qty: 30 G | Refills: 3 | Status: SHIPPED | OUTPATIENT
Start: 2023-08-24 | End: 2024-04-02 | Stop reason: ALTCHOICE

## 2023-08-24 NOTE — PROGRESS NOTES
Windy Venegas is a 73 y.o. female who presents for Rash.  Rash      Pt with rash under breasts for about three weeks. First noticed when outside gardening not wearing a bra.  Itches quite a bit, waxes and wanes in intensity.  Has tried neosporin.  Review of Systems   Skin:  Positive for rash.   All other systems reviewed and are negative.  .    Objective     Visit Vitals  /82 (BP Location: Left arm, Patient Position: Sitting)   Pulse 76      Physical Exam  HENT:      Head: Normocephalic.   Pulmonary:      Effort: Pulmonary effort is normal.   Skin:     Comments: Maculopapular rash under both breasts with satellite lesions   Neurological:      Mental Status: She is alert. Mental status is at baseline.   Psychiatric:         Mood and Affect: Mood normal.         Assessment/Plan   Problem List Items Addressed This Visit    None  Visit Diagnoses       Candidal intertrigo    -  Primary    Relevant Medications    nystatin (Mycostatin) cream    nystatin (Mycostatin) 100,000 unit/gram powder                   Deonna Guzman MD

## 2023-09-03 DIAGNOSIS — E11.65 TYPE 2 DIABETES MELLITUS WITH HYPERGLYCEMIA, WITHOUT LONG-TERM CURRENT USE OF INSULIN (MULTI): ICD-10-CM

## 2023-09-06 RX ORDER — METFORMIN HYDROCHLORIDE 500 MG/1
500 TABLET ORAL
Qty: 180 TABLET | Refills: 1 | Status: SHIPPED | OUTPATIENT
Start: 2023-09-06 | End: 2023-12-05 | Stop reason: SDUPTHER

## 2023-10-19 ENCOUNTER — OFFICE VISIT (OUTPATIENT)
Dept: PRIMARY CARE | Facility: CLINIC | Age: 73
End: 2023-10-19
Payer: MEDICARE

## 2023-10-19 ENCOUNTER — ANCILLARY PROCEDURE (OUTPATIENT)
Dept: RADIOLOGY | Facility: CLINIC | Age: 73
End: 2023-10-19
Payer: MEDICARE

## 2023-10-19 VITALS
SYSTOLIC BLOOD PRESSURE: 138 MMHG | WEIGHT: 198 LBS | DIASTOLIC BLOOD PRESSURE: 80 MMHG | HEIGHT: 68 IN | HEART RATE: 64 BPM | BODY MASS INDEX: 30.01 KG/M2

## 2023-10-19 DIAGNOSIS — M25.551 RIGHT HIP PAIN: Primary | ICD-10-CM

## 2023-10-19 DIAGNOSIS — Z28.21 INFLUENZA VACCINATION DECLINED: ICD-10-CM

## 2023-10-19 DIAGNOSIS — M25.551 RIGHT HIP PAIN: ICD-10-CM

## 2023-10-19 PROCEDURE — 73502 X-RAY EXAM HIP UNI 2-3 VIEWS: CPT | Mod: RT,FY

## 2023-10-19 PROCEDURE — 99213 OFFICE O/P EST LOW 20 MIN: CPT | Performed by: FAMILY MEDICINE

## 2023-10-19 PROCEDURE — 3044F HG A1C LEVEL LT 7.0%: CPT | Performed by: FAMILY MEDICINE

## 2023-10-19 PROCEDURE — 1160F RVW MEDS BY RX/DR IN RCRD: CPT | Performed by: FAMILY MEDICINE

## 2023-10-19 PROCEDURE — 1036F TOBACCO NON-USER: CPT | Performed by: FAMILY MEDICINE

## 2023-10-19 PROCEDURE — 1159F MED LIST DOCD IN RCRD: CPT | Performed by: FAMILY MEDICINE

## 2023-10-19 PROCEDURE — 3075F SYST BP GE 130 - 139MM HG: CPT | Performed by: FAMILY MEDICINE

## 2023-10-19 PROCEDURE — 73502 X-RAY EXAM HIP UNI 2-3 VIEWS: CPT | Mod: RIGHT SIDE | Performed by: RADIOLOGY

## 2023-10-19 PROCEDURE — 3079F DIAST BP 80-89 MM HG: CPT | Performed by: FAMILY MEDICINE

## 2023-10-19 RX ORDER — MELOXICAM 15 MG/1
TABLET ORAL
Qty: 30 TABLET | Refills: 0 | Status: SHIPPED | OUTPATIENT
Start: 2023-10-19

## 2023-10-19 NOTE — PROGRESS NOTES
Patient presents for periodic surveillance of chronic medical problems.     Subjective  Linda Venegas is a 73 y.o. female who presents for RT hip pain.  HPI  Right groin pain for two weeks.  Anterior groin pain , worse with walking.  Hard to get in and out of car, has to lift leg up into car.  No back pain.  No no new numbness/tingling down leg or in groin,   No new loss of bowel or bladder control.    Has gyn appt next week.  One ovary intact.  Review of Systems   All other systems reviewed and are negative.  .    Objective     Visit Vitals  /80 (BP Location: Left arm, Patient Position: Sitting)   Pulse 64      Physical Exam  Vitals reviewed.   HENT:      Head: Normocephalic.   Pulmonary:      Effort: Pulmonary effort is normal.   Musculoskeletal:      Comments: Straight leg raise negative bilaterlly, Left hip good range of motion, right hip very limited range of motion   Neurological:      Mental Status: She is alert.         Assessment/Plan   Problem List Items Addressed This Visit    None  Visit Diagnoses       Right hip pain    -  Primary    Relevant Medications    meloxicam (Mobic) 15 mg tablet    Other Relevant Orders    XR hip right 2 or 3 views    Influenza vaccination declined                       Deonna Guzman MD

## 2023-10-24 NOTE — PROGRESS NOTES
Patient ID: Linda Venegas is a 73 y.o. female.  Primary Care Provider: Deonna Guzman MD    Subjective    HPI: 73 y/o with vaginal dysplasia.     10/19 LSIL  6/20 LSIL, cannot exclude HSIL, HPV 16 positive, bx negative  8/20 VAIN 1  4/21 HSIL pap HPV 16 positive  10/11/21 laser ablation of vagina.  Doing well postop.  6/2022 PAP LSIL cannot rule out HSIL, HPV 16+, HPV non 16/18 +    PMH: HTN, HLD, T2DM, asthma, anxiety, arthritis  PSH: Breast biopsy, knee replacement, hysterectomy, tonsillectomy  Social history: Denies use of alcohol, tobacco and other drug use.       Objective    Visit Vitals  /78   Pulse 66   Temp 36 °C (96.8 °F)   Resp 16        Interval history:  72 y/o with vaginal dysplasia, persistent after laser ablation. Vaginal cuff bx in 2021 showed atrophic changes.  Had colposcopy 3/2023 which was negative.  Here for 6 month vaginal pap.  Was started on Estrace last visit. Patient denies any vaginal bleeding, pink tinged discharge. Patient denies any constipation or diarrhea.  Appetite has been good.  Energy levels are baseline.  Patient denies hematuria.  Patient denies urinary leakage or incontinence. Patient been using Estrace a few times a month, notices she no longer has pink tinged discharge with wiping.  Mammogram is up to date.   Patient is not currently sexually active.      Physical Exam:    Constitutional: Doing well. RAFAELA  Eyes: PERRL  ENMT: Moist mucus membranes  Head/Neck: Supple. Symmetrical  Cardiovascular: Regular, rate and rhythm. 2+ equal pulses of the extremities  Respiratory/Thorax: CTA. RRR. Chest rise symmetrical.  Gastrointestinal: Non-distended, soft, non-tender  Genitourinary:   Normal external female genitalia. No vulvar lesions noted  Speculum exam: Smooth vaginal walls without lesions or masses, atrophic changes noted. Vaginal cuff visualized without lesions. Surgically absent cervix.  Bimanual exam: Smooth vaginal wall without lesions or masses.  Surgically absent  uterus, cervix, and adnexa.  Musculoskeletal: ROM intact, no joint swelling, normal strength  Extremities: No edema  Neurological: Alert and oriented x 3. Pleasant and cooperative.  Lymphatic: No lymphadenopathy. No lymphedema  Psychological: Appropriate mood and behavior  Skin: Warm and dry, no lesions, no rashes    A complete review of systems was performed and all systems were normal except what is noted in the interval history.      Assessment/Plan   74 y/o with vaginal dysplasia, persistent after laser ablation. Vaginal atrophy.       PLAN:  Continue with Estrace as directed  Pap today, F/U results- patient would like results mailed  If negative, pap in 1 year, F/U in 6 months

## 2023-10-25 ENCOUNTER — OFFICE VISIT (OUTPATIENT)
Dept: GYNECOLOGIC ONCOLOGY | Facility: CLINIC | Age: 73
End: 2023-10-25
Payer: MEDICARE

## 2023-10-25 VITALS
DIASTOLIC BLOOD PRESSURE: 78 MMHG | HEART RATE: 66 BPM | TEMPERATURE: 96.8 F | BODY MASS INDEX: 28.56 KG/M2 | OXYGEN SATURATION: 95 % | RESPIRATION RATE: 16 BRPM | SYSTOLIC BLOOD PRESSURE: 142 MMHG | WEIGHT: 187.83 LBS

## 2023-10-25 DIAGNOSIS — N89.3 VAGINAL DYSPLASIA: Primary | ICD-10-CM

## 2023-10-25 PROCEDURE — 99214 OFFICE O/P EST MOD 30 MIN: CPT | Mod: 25 | Performed by: NURSE PRACTITIONER

## 2023-10-25 PROCEDURE — 3044F HG A1C LEVEL LT 7.0%: CPT | Performed by: NURSE PRACTITIONER

## 2023-10-25 PROCEDURE — 88175 CYTOPATH C/V AUTO FLUID REDO: CPT | Mod: TC,GCY | Performed by: NURSE PRACTITIONER

## 2023-10-25 PROCEDURE — 87624 HPV HI-RISK TYP POOLED RSLT: CPT | Performed by: NURSE PRACTITIONER

## 2023-10-25 PROCEDURE — 1036F TOBACCO NON-USER: CPT | Performed by: NURSE PRACTITIONER

## 2023-10-25 PROCEDURE — 3078F DIAST BP <80 MM HG: CPT | Performed by: NURSE PRACTITIONER

## 2023-10-25 PROCEDURE — 1160F RVW MEDS BY RX/DR IN RCRD: CPT | Performed by: NURSE PRACTITIONER

## 2023-10-25 PROCEDURE — 99214 OFFICE O/P EST MOD 30 MIN: CPT | Performed by: NURSE PRACTITIONER

## 2023-10-25 PROCEDURE — 3077F SYST BP >= 140 MM HG: CPT | Performed by: NURSE PRACTITIONER

## 2023-10-25 PROCEDURE — 1159F MED LIST DOCD IN RCRD: CPT | Performed by: NURSE PRACTITIONER

## 2023-10-25 PROCEDURE — 88141 CYTOPATH C/V INTERPRET: CPT | Performed by: PATHOLOGY

## 2023-10-25 PROCEDURE — 1126F AMNT PAIN NOTED NONE PRSNT: CPT | Performed by: NURSE PRACTITIONER

## 2023-10-25 ASSESSMENT — PATIENT HEALTH QUESTIONNAIRE - PHQ9
SUM OF ALL RESPONSES TO PHQ9 QUESTIONS 1 AND 2: 0
8. MOVING OR SPEAKING SO SLOWLY THAT OTHER PEOPLE COULD HAVE NOTICED. OR THE OPPOSITE, BEING SO FIGETY OR RESTLESS THAT YOU HAVE BEEN MOVING AROUND A LOT MORE THAN USUAL: NOT AT ALL
SUM OF ALL RESPONSES TO PHQ QUESTIONS 1-9: 0
6. FEELING BAD ABOUT YOURSELF - OR THAT YOU ARE A FAILURE OR HAVE LET YOURSELF OR YOUR FAMILY DOWN: NOT AT ALL
6. FEELING BAD ABOUT YOURSELF - OR THAT YOU ARE A FAILURE OR HAVE LET YOURSELF OR YOUR FAMILY DOWN: 0
2. FEELING DOWN, DEPRESSED, IRRITABLE, OR HOPELESS: 0
7. TROUBLE CONCENTRATING ON THINGS, SUCH AS READING THE NEWSPAPER OR WATCHING TELEVISION: NOT AT ALL
7. TROUBLE CONCENTRATING ON THINGS, SUCH AS READING THE NEWSPAPER OR WATCHING TELEVISION: NOT AT ALL
8. MOVING OR SPEAKING SO SLOWLY THAT OTHER PEOPLE COULD HAVE NOTICED. OR THE OPPOSITE, BEING SO FIGETY OR RESTLESS THAT YOU HAVE BEEN MOVING AROUND A LOT MORE THAN USUAL: 0
6. FEELING BAD ABOUT YOURSELF - OR THAT YOU ARE A FAILURE OR HAVE LET YOURSELF OR YOUR FAMILY DOWN: NOT AT ALL
5. POOR APPETITE OR OVEREATING: 0
9. THOUGHTS THAT YOU WOULD BE BETTER OFF DEAD, OR OF HURTING YOURSELF: NOT AT ALL
1. LITTLE INTEREST OR PLEASURE IN DOING THINGS: NOT AT ALL
9. THOUGHTS THAT YOU WOULD BE BETTER OFF DEAD, OR OF HURTING YOURSELF: 0
1. LITTLE INTEREST OR PLEASURE IN DOING THINGS: NOT AT ALL
2. FEELING DOWN, DEPRESSED OR HOPELESS: NOT AT ALL
3. TROUBLE FALLING OR STAYING ASLEEP OR SLEEPING TOO MUCH: NOT AT ALL
3. TROUBLE FALLING OR STAYING ASLEEP OR SLEEPING TOO MUCH: NOT AT ALL
10. IF YOU CHECKED OFF ANY PROBLEMS, HOW DIFFICULT HAVE THESE PROBLEMS MADE IT FOR YOU TO DO YOUR WORK, TAKE CARE OF THINGS AT HOME, OR GET ALONG WITH OTHER PEOPLE: NOT DIFFICULT AT ALL
1. LITTLE INTEREST OR PLEASURE IN DOING THINGS: 0
1. LITTLE INTEREST OR PLEASURE IN DOING THINGS: NOT AT ALL
4. FEELING TIRED OR HAVING LITTLE ENERGY: NOT AT ALL
3. TROUBLE FALLING OR STAYING ASLEEP OR SLEEPING TOO MUCH: 0
5. POOR APPETITE OR OVEREATING: NOT AT ALL
8. MOVING OR SPEAKING SO SLOWLY THAT OTHER PEOPLE COULD HAVE NOTICED. OR THE OPPOSITE, BEING SO FIGETY OR RESTLESS THAT YOU HAVE BEEN MOVING AROUND A LOT MORE THAN USUAL: NOT AT ALL
5. POOR APPETITE OR OVEREATING: NOT AT ALL
9. THOUGHTS THAT YOU WOULD BE BETTER OFF DEAD, OR OF HURTING YOURSELF: NOT AT ALL
SUM OF ALL RESPONSES TO PHQ QUESTIONS 1-9: 0
10. IF YOU CHECKED OFF ANY PROBLEMS, HOW DIFFICULT HAVE THESE PROBLEMS MADE IT FOR YOU TO DO YOUR WORK, TAKE CARE OF THINGS AT HOME, OR GET ALONG WITH OTHER PEOPLE: NOT DIFFICULT AT ALL
4. FEELING TIRED OR HAVING LITTLE ENERGY: NOT AT ALL
2. FEELING DOWN, DEPRESSED, IRRITABLE, OR HOPELESS: NOT AT ALL
7. TROUBLE CONCENTRATING ON THINGS, SUCH AS READING THE NEWSPAPER OR WATCHING TELEVISION: 0
2. FEELING DOWN, DEPRESSED OR HOPELESS: NOT AT ALL
4. FEELING TIRED OR HAVING LITTLE ENERGY: 0

## 2023-10-25 ASSESSMENT — COLUMBIA-SUICIDE SEVERITY RATING SCALE - C-SSRS
1. IN THE PAST MONTH, HAVE YOU WISHED YOU WERE DEAD OR WISHED YOU COULD GO TO SLEEP AND NOT WAKE UP?: NO
2. HAVE YOU ACTUALLY HAD ANY THOUGHTS OF KILLING YOURSELF?: NO
6. HAVE YOU EVER DONE ANYTHING, STARTED TO DO ANYTHING, OR PREPARED TO DO ANYTHING TO END YOUR LIFE?: NO

## 2023-10-25 ASSESSMENT — ENCOUNTER SYMPTOMS
DEPRESSION: 0
LOSS OF SENSATION IN FEET: 0
OCCASIONAL FEELINGS OF UNSTEADINESS: 0

## 2023-10-25 ASSESSMENT — PAIN SCALES - GENERAL: PAINLEVEL: 0-NO PAIN

## 2023-11-08 ENCOUNTER — TELEPHONE (OUTPATIENT)
Dept: GYNECOLOGIC ONCOLOGY | Facility: HOSPITAL | Age: 73
End: 2023-11-08
Payer: MEDICARE

## 2023-11-08 LAB
CYTOLOGY CMNT CVX/VAG CYTO-IMP: NORMAL
HPV HR GENOTYPES PNL CVX NAA+PROBE: POSITIVE
HPV HR GENOTYPES PNL CVX NAA+PROBE: POSITIVE
HPV16 DNA SPEC QL NAA+PROBE: POSITIVE
HPV18 DNA SPEC QL NAA+PROBE: NEGATIVE
LAB AP HPV GENOTYPE QUESTION: YES
LAB AP HPV HR: NORMAL
LAB AP PREVIOUS ABNORMAL HISTORY: NORMAL
LABORATORY COMMENT REPORT: NORMAL
MENSTRUAL HX REPORTED: NORMAL
PATH REPORT.TOTAL CANCER: NORMAL
RESIDENT REVIEW: NORMAL

## 2023-11-08 NOTE — TELEPHONE ENCOUNTER
Phoned patient to notify her that pap showed HGSIL and that Florecita Adames CNP recommends return office visit for colposcopy.   Message sent to Lakeshia Guzman and Ayleen Best requesting they contact patient to schedule colposcopy appointment with Florecita.

## 2023-11-10 ENCOUNTER — OFFICE VISIT (OUTPATIENT)
Dept: PRIMARY CARE | Facility: CLINIC | Age: 73
End: 2023-11-10
Payer: MEDICARE

## 2023-11-10 VITALS
BODY MASS INDEX: 29.55 KG/M2 | HEART RATE: 64 BPM | HEIGHT: 68 IN | SYSTOLIC BLOOD PRESSURE: 138 MMHG | DIASTOLIC BLOOD PRESSURE: 80 MMHG | WEIGHT: 195 LBS

## 2023-11-10 DIAGNOSIS — R05.1 ACUTE COUGH: Primary | ICD-10-CM

## 2023-11-10 PROCEDURE — 1126F AMNT PAIN NOTED NONE PRSNT: CPT | Performed by: FAMILY MEDICINE

## 2023-11-10 PROCEDURE — 3075F SYST BP GE 130 - 139MM HG: CPT | Performed by: FAMILY MEDICINE

## 2023-11-10 PROCEDURE — 1159F MED LIST DOCD IN RCRD: CPT | Performed by: FAMILY MEDICINE

## 2023-11-10 PROCEDURE — 3044F HG A1C LEVEL LT 7.0%: CPT | Performed by: FAMILY MEDICINE

## 2023-11-10 PROCEDURE — 3079F DIAST BP 80-89 MM HG: CPT | Performed by: FAMILY MEDICINE

## 2023-11-10 PROCEDURE — 99213 OFFICE O/P EST LOW 20 MIN: CPT | Performed by: FAMILY MEDICINE

## 2023-11-10 PROCEDURE — 1160F RVW MEDS BY RX/DR IN RCRD: CPT | Performed by: FAMILY MEDICINE

## 2023-11-10 PROCEDURE — 1036F TOBACCO NON-USER: CPT | Performed by: FAMILY MEDICINE

## 2023-11-10 RX ORDER — BENZONATATE 100 MG/1
100 CAPSULE ORAL 3 TIMES DAILY PRN
Qty: 30 CAPSULE | Refills: 0 | Status: SHIPPED | OUTPATIENT
Start: 2023-11-10 | End: 2023-12-06 | Stop reason: ALTCHOICE

## 2023-11-10 RX ORDER — DOXYCYCLINE 100 MG/1
100 CAPSULE ORAL 2 TIMES DAILY
Qty: 14 CAPSULE | Refills: 0 | Status: SHIPPED | OUTPATIENT
Start: 2023-11-10 | End: 2023-11-17

## 2023-11-10 RX ORDER — PREDNISONE 20 MG/1
40 TABLET ORAL DAILY
Qty: 10 TABLET | Refills: 0 | Status: SHIPPED | OUTPATIENT
Start: 2023-11-10 | End: 2023-11-15

## 2023-11-10 NOTE — PROGRESS NOTES
"     Subjective  Trudence D Frames \"Linda\" is a 73 y.o. female who presents for Cough.  HPI  One  week of productive cough, nasal congestion, worse at night. No fevers/chills.    Review of Systems   All other systems reviewed and are negative.  .    Objective     Visit Vitals  /80 (BP Location: Left arm, Patient Position: Sitting)   Pulse 64      Physical Exam  Vitals and nursing note reviewed.   Constitutional:       General: She is not in acute distress.     Appearance: Normal appearance. She is not toxic-appearing.   HENT:      Head: Normocephalic and atraumatic.      Right Ear: Tympanic membrane normal.      Left Ear: Tympanic membrane normal.      Mouth/Throat:      Mouth: Mucous membranes are moist.      Pharynx: No posterior oropharyngeal erythema.   Cardiovascular:      Rate and Rhythm: Normal rate and regular rhythm.      Heart sounds: No murmur heard.  Pulmonary:      Effort: Pulmonary effort is normal.      Breath sounds: Normal breath sounds.   Musculoskeletal:      Cervical back: Neck supple. No rigidity.      Comments:     Skin:     General: Skin is warm and dry.   Neurological:      General: No focal deficit present.      Mental Status: She is alert and oriented to person, place, and time.   Psychiatric:         Mood and Affect: Mood normal.         Behavior: Behavior normal.         Assessment/Plan   Problem List Items Addressed This Visit    None  Visit Diagnoses       Acute cough    -  Primary    Relevant Medications    benzonatate (Tessalon) 100 mg capsule    predniSONE (Deltasone) 20 mg tablet    doxycycline (Vibramycin) 100 mg capsule                   Deonna Guzman MD   "

## 2023-11-28 NOTE — PROGRESS NOTES
Patient ID: Linda Venegas is a 73 y.o. female.  Primary Care Provider: Deonna Guzman MD    Subjective    HPI: 71 y/o with vaginal dysplasia.     10/19 LSIL  6/20 LSIL, cannot exclude HSIL, HPV 16 positive, bx negative  8/20 VAIN 1  4/21 HSIL pap HPV 16 positive  10/11/21 laser ablation of vagina.    6/2022 PAP LSIL cannot rule out HSIL, HPV 16+, HPV non 16/18 +    PMH: HTN, HLD, T2DM, asthma, anxiety, arthritis  PSH: Breast biopsy, knee replacement, hysterectomy, tonsillectomy  Social history: Denies use of alcohol, tobacco and other drug use.       Objective  Visit Vitals  /68 (BP Location: Right arm, Patient Position: Sitting, BP Cuff Size: Adult)   Pulse 66   Temp 36.1 °C (97 °F) (Temporal)   Resp 16       Interval history:  Patient is a 72 y/o with vaginal dysplasia, persistent after laser ablation. Last pap 10/23 HGSIL, HPV 16+.   Patient using estrace three nights a week.  No other new complaints.  Patient here for colposcopy.       Physical Exam:    Constitutional: Doing well. RAFAELA  Eyes: PERRL  ENMT: Moist mucus membranes  Head/Neck: Supple. Symmetrical  Genitourinary:   Colposcopy: Adequate colposcopy performed after application of dilute acetic acid solution to the vaginal cuff  There were some acetowhite changes and atrophic changes noted.  Vaginal cuff biopsy at 7 oclock taken.  Monsel's and pressure were used for hemostasis.  The patient tolerated the procedure well.  Musculoskeletal: ROM intact, no joint swelling, normal strength  Extremities: No edema  Neurological: Alert and oriented x 3. Pleasant and cooperative.  Psychological: Appropriate mood and behavior  Skin: Warm and dry, no lesions, no rashes    A complete review of systems was performed and all systems were normal except what is noted in the interval history.      Assessment/Plan Patient is a 72 y/o with vaginal dysplasia, persistent after laser ablation. Last pap 10/23 HGSIL, HPV 16+. +acetowhite changes.       PLAN:  Vaginal cuff  biopsy today, F/U results  If VAIN 1 , repeat pap in 1 year.  F/U in 6 months for exam  If VAIN 2-3, referral for laser ablation to Dr. Xander Locke to [facility]

## 2023-11-29 ENCOUNTER — OFFICE VISIT (OUTPATIENT)
Dept: GYNECOLOGIC ONCOLOGY | Facility: CLINIC | Age: 73
End: 2023-11-29
Payer: MEDICARE

## 2023-11-29 VITALS
RESPIRATION RATE: 16 BRPM | OXYGEN SATURATION: 96 % | TEMPERATURE: 97 F | SYSTOLIC BLOOD PRESSURE: 123 MMHG | DIASTOLIC BLOOD PRESSURE: 68 MMHG | WEIGHT: 197.31 LBS | BODY MASS INDEX: 30 KG/M2 | HEART RATE: 66 BPM

## 2023-11-29 DIAGNOSIS — N95.2 VAGINAL ATROPHY: ICD-10-CM

## 2023-11-29 DIAGNOSIS — N89.3 VAGINAL DYSPLASIA: Primary | ICD-10-CM

## 2023-11-29 PROCEDURE — 99214 OFFICE O/P EST MOD 30 MIN: CPT | Performed by: NURSE PRACTITIONER

## 2023-11-29 PROCEDURE — 3044F HG A1C LEVEL LT 7.0%: CPT | Performed by: NURSE PRACTITIONER

## 2023-11-29 PROCEDURE — 88305 TISSUE EXAM BY PATHOLOGIST: CPT | Performed by: STUDENT IN AN ORGANIZED HEALTH CARE EDUCATION/TRAINING PROGRAM

## 2023-11-29 PROCEDURE — 1036F TOBACCO NON-USER: CPT | Performed by: NURSE PRACTITIONER

## 2023-11-29 PROCEDURE — 3078F DIAST BP <80 MM HG: CPT | Performed by: NURSE PRACTITIONER

## 2023-11-29 PROCEDURE — 1126F AMNT PAIN NOTED NONE PRSNT: CPT | Performed by: NURSE PRACTITIONER

## 2023-11-29 PROCEDURE — 1159F MED LIST DOCD IN RCRD: CPT | Performed by: NURSE PRACTITIONER

## 2023-11-29 PROCEDURE — 1160F RVW MEDS BY RX/DR IN RCRD: CPT | Performed by: NURSE PRACTITIONER

## 2023-11-29 PROCEDURE — 3074F SYST BP LT 130 MM HG: CPT | Performed by: NURSE PRACTITIONER

## 2023-11-29 PROCEDURE — 88305 TISSUE EXAM BY PATHOLOGIST: CPT | Mod: TC,SUR,STJLAB | Performed by: NURSE PRACTITIONER

## 2023-11-29 RX ORDER — ESTRADIOL 0.1 MG/G
CREAM VAGINAL
Qty: 42.5 G | Refills: 3 | Status: SHIPPED | OUTPATIENT
Start: 2023-11-29

## 2023-11-29 ASSESSMENT — PAIN SCALES - GENERAL: PAINLEVEL: 0-NO PAIN

## 2023-12-04 DIAGNOSIS — B37.2 CANDIDAL INTERTRIGO: ICD-10-CM

## 2023-12-04 DIAGNOSIS — E11.65 TYPE 2 DIABETES MELLITUS WITH HYPERGLYCEMIA, WITHOUT LONG-TERM CURRENT USE OF INSULIN (MULTI): ICD-10-CM

## 2023-12-05 ENCOUNTER — OFFICE VISIT (OUTPATIENT)
Dept: PRIMARY CARE | Facility: CLINIC | Age: 73
End: 2023-12-05
Payer: MEDICARE

## 2023-12-05 ENCOUNTER — LAB (OUTPATIENT)
Dept: LAB | Facility: LAB | Age: 73
End: 2023-12-05
Payer: MEDICARE

## 2023-12-05 VITALS
WEIGHT: 197.6 LBS | BODY MASS INDEX: 29.95 KG/M2 | DIASTOLIC BLOOD PRESSURE: 78 MMHG | HEIGHT: 68 IN | HEART RATE: 76 BPM | SYSTOLIC BLOOD PRESSURE: 130 MMHG

## 2023-12-05 DIAGNOSIS — E11.65 TYPE 2 DIABETES MELLITUS WITH HYPERGLYCEMIA, WITHOUT LONG-TERM CURRENT USE OF INSULIN (MULTI): ICD-10-CM

## 2023-12-05 DIAGNOSIS — I10 BENIGN HYPERTENSION: Primary | ICD-10-CM

## 2023-12-05 DIAGNOSIS — E78.49 OTHER HYPERLIPIDEMIA: ICD-10-CM

## 2023-12-05 LAB
ALBUMIN SERPL BCP-MCNC: 4.2 G/DL (ref 3.4–5)
ALP SERPL-CCNC: 109 U/L (ref 33–136)
ALT SERPL W P-5'-P-CCNC: 18 U/L (ref 7–45)
ANION GAP SERPL CALC-SCNC: 10 MMOL/L (ref 10–20)
AST SERPL W P-5'-P-CCNC: 19 U/L (ref 9–39)
BACTERIA #/AREA URNS AUTO: ABNORMAL /HPF
BASOPHILS # BLD AUTO: 0.05 X10*3/UL (ref 0–0.1)
BASOPHILS NFR BLD AUTO: 0.9 %
BILIRUB SERPL-MCNC: 0.7 MG/DL (ref 0–1.2)
BUN SERPL-MCNC: 14 MG/DL (ref 6–23)
CALCIUM SERPL-MCNC: 9.2 MG/DL (ref 8.6–10.3)
CHLORIDE SERPL-SCNC: 103 MMOL/L (ref 98–107)
CHOLEST SERPL-MCNC: 169 MG/DL (ref 0–199)
CHOLESTEROL/HDL RATIO: 3.1
CO2 SERPL-SCNC: 31 MMOL/L (ref 21–32)
CREAT SERPL-MCNC: 0.71 MG/DL (ref 0.5–1.05)
EOSINOPHIL # BLD AUTO: 0.17 X10*3/UL (ref 0–0.4)
EOSINOPHIL NFR BLD AUTO: 3.2 %
ERYTHROCYTE [DISTWIDTH] IN BLOOD BY AUTOMATED COUNT: 11.9 % (ref 11.5–14.5)
EST. AVERAGE GLUCOSE BLD GHB EST-MCNC: 166 MG/DL
GFR SERPL CREATININE-BSD FRML MDRD: 89 ML/MIN/1.73M*2
GLUCOSE SERPL-MCNC: 157 MG/DL (ref 74–99)
HBA1C MFR BLD: 7.4 %
HCT VFR BLD AUTO: 42.6 % (ref 36–46)
HDLC SERPL-MCNC: 55 MG/DL
HGB BLD-MCNC: 14.2 G/DL (ref 12–16)
IMM GRANULOCYTES # BLD AUTO: 0.01 X10*3/UL (ref 0–0.5)
IMM GRANULOCYTES NFR BLD AUTO: 0.2 % (ref 0–0.9)
LDLC SERPL CALC-MCNC: 80 MG/DL
LYMPHOCYTES # BLD AUTO: 2.36 X10*3/UL (ref 0.8–3)
LYMPHOCYTES NFR BLD AUTO: 44.2 %
MCH RBC QN AUTO: 31.2 PG (ref 26–34)
MCHC RBC AUTO-ENTMCNC: 33.3 G/DL (ref 32–36)
MCV RBC AUTO: 94 FL (ref 80–100)
MONOCYTES # BLD AUTO: 0.46 X10*3/UL (ref 0.05–0.8)
MONOCYTES NFR BLD AUTO: 8.6 %
MUCOUS THREADS #/AREA URNS AUTO: ABNORMAL /LPF
NEUTROPHILS # BLD AUTO: 2.29 X10*3/UL (ref 1.6–5.5)
NEUTROPHILS NFR BLD AUTO: 42.9 %
NON HDL CHOLESTEROL: 114 MG/DL (ref 0–149)
NRBC BLD-RTO: 0 /100 WBCS (ref 0–0)
PLATELET # BLD AUTO: 182 X10*3/UL (ref 150–450)
POTASSIUM SERPL-SCNC: 4.4 MMOL/L (ref 3.5–5.3)
PROT SERPL-MCNC: 6.4 G/DL (ref 6.4–8.2)
RBC # BLD AUTO: 4.55 X10*6/UL (ref 4–5.2)
RBC #/AREA URNS AUTO: ABNORMAL /HPF
SODIUM SERPL-SCNC: 140 MMOL/L (ref 136–145)
SQUAMOUS #/AREA URNS AUTO: ABNORMAL /HPF
TRIGL SERPL-MCNC: 171 MG/DL (ref 0–149)
TSH SERPL-ACNC: 1.93 MIU/L (ref 0.44–3.98)
VIT B12 SERPL-MCNC: 1188 PG/ML (ref 211–911)
VLDL: 34 MG/DL (ref 0–40)
WBC # BLD AUTO: 5.3 X10*3/UL (ref 4.4–11.3)
WBC #/AREA URNS AUTO: ABNORMAL /HPF

## 2023-12-05 PROCEDURE — 85025 COMPLETE CBC W/AUTO DIFF WBC: CPT

## 2023-12-05 PROCEDURE — 1126F AMNT PAIN NOTED NONE PRSNT: CPT | Performed by: FAMILY MEDICINE

## 2023-12-05 PROCEDURE — 3048F LDL-C <100 MG/DL: CPT | Performed by: FAMILY MEDICINE

## 2023-12-05 PROCEDURE — 1160F RVW MEDS BY RX/DR IN RCRD: CPT | Performed by: FAMILY MEDICINE

## 2023-12-05 PROCEDURE — 3051F HG A1C>EQUAL 7.0%<8.0%: CPT | Performed by: FAMILY MEDICINE

## 2023-12-05 PROCEDURE — 81001 URINALYSIS AUTO W/SCOPE: CPT

## 2023-12-05 PROCEDURE — 83036 HEMOGLOBIN GLYCOSYLATED A1C: CPT

## 2023-12-05 PROCEDURE — 3075F SYST BP GE 130 - 139MM HG: CPT | Performed by: FAMILY MEDICINE

## 2023-12-05 PROCEDURE — 82607 VITAMIN B-12: CPT

## 2023-12-05 PROCEDURE — 1036F TOBACCO NON-USER: CPT | Performed by: FAMILY MEDICINE

## 2023-12-05 PROCEDURE — 99214 OFFICE O/P EST MOD 30 MIN: CPT | Performed by: FAMILY MEDICINE

## 2023-12-05 PROCEDURE — 36415 COLL VENOUS BLD VENIPUNCTURE: CPT

## 2023-12-05 PROCEDURE — 3044F HG A1C LEVEL LT 7.0%: CPT | Performed by: FAMILY MEDICINE

## 2023-12-05 PROCEDURE — 3078F DIAST BP <80 MM HG: CPT | Performed by: FAMILY MEDICINE

## 2023-12-05 PROCEDURE — 1159F MED LIST DOCD IN RCRD: CPT | Performed by: FAMILY MEDICINE

## 2023-12-05 PROCEDURE — 84443 ASSAY THYROID STIM HORMONE: CPT

## 2023-12-05 PROCEDURE — 80053 COMPREHEN METABOLIC PANEL: CPT

## 2023-12-05 PROCEDURE — 80061 LIPID PANEL: CPT

## 2023-12-05 RX ORDER — METFORMIN HYDROCHLORIDE 1000 MG/1
1000 TABLET ORAL
Qty: 180 TABLET | Refills: 1 | Status: SHIPPED | OUTPATIENT
Start: 2023-12-05 | End: 2023-12-06 | Stop reason: SDUPTHER

## 2023-12-05 RX ORDER — NYSTATIN 100000 U/G
CREAM TOPICAL
Qty: 30 G | Refills: 0 | Status: SHIPPED | OUTPATIENT
Start: 2023-12-05 | End: 2024-01-08

## 2023-12-05 RX ORDER — IBUPROFEN 200 MG
1 CAPSULE ORAL DAILY
Qty: 100 STRIP | Refills: 1 | Status: SHIPPED | OUTPATIENT
Start: 2023-12-05 | End: 2024-06-03

## 2023-12-06 DIAGNOSIS — E11.65 TYPE 2 DIABETES MELLITUS WITH HYPERGLYCEMIA, WITHOUT LONG-TERM CURRENT USE OF INSULIN (MULTI): ICD-10-CM

## 2023-12-06 RX ORDER — METFORMIN HYDROCHLORIDE 500 MG/1
500 TABLET ORAL
Start: 2023-12-06 | End: 2024-04-18 | Stop reason: SDUPTHER

## 2023-12-08 ENCOUNTER — TELEPHONE (OUTPATIENT)
Dept: GYNECOLOGIC ONCOLOGY | Facility: HOSPITAL | Age: 73
End: 2023-12-08
Payer: MEDICARE

## 2023-12-08 LAB
LABORATORY COMMENT REPORT: NORMAL
PATH REPORT.FINAL DX SPEC: NORMAL
PATH REPORT.GROSS SPEC: NORMAL
PATH REPORT.RELEVANT HX SPEC: NORMAL
PATH REPORT.TOTAL CANCER: NORMAL

## 2023-12-08 NOTE — TELEPHONE ENCOUNTER
Phoned patient to notify her that vaginal cuff biopsy result is negative for dysplasia and Florecita Adames CNP recommends keeping appointment as scheduled April 2024.

## 2023-12-09 DIAGNOSIS — F41.9 ANXIETY: ICD-10-CM

## 2023-12-09 DIAGNOSIS — E78.49 OTHER HYPERLIPIDEMIA: ICD-10-CM

## 2023-12-09 DIAGNOSIS — I10 BENIGN HYPERTENSION: ICD-10-CM

## 2023-12-11 RX ORDER — CITALOPRAM 20 MG/1
20 TABLET, FILM COATED ORAL DAILY
Qty: 90 TABLET | Refills: 1 | Status: SHIPPED | OUTPATIENT
Start: 2023-12-11 | End: 2024-05-31

## 2023-12-11 RX ORDER — ATORVASTATIN CALCIUM 10 MG/1
10 TABLET, FILM COATED ORAL DAILY
Qty: 90 TABLET | Refills: 1 | Status: SHIPPED | OUTPATIENT
Start: 2023-12-11 | End: 2024-05-31

## 2023-12-11 RX ORDER — BISOPROLOL FUMARATE AND HYDROCHLOROTHIAZIDE 5; 6.25 MG/1; MG/1
1 TABLET ORAL DAILY
Qty: 90 TABLET | Refills: 1 | Status: SHIPPED | OUTPATIENT
Start: 2023-12-11 | End: 2024-05-31

## 2023-12-28 ENCOUNTER — TELEPHONE (OUTPATIENT)
Dept: PRIMARY CARE | Facility: CLINIC | Age: 73
End: 2023-12-28
Payer: MEDICARE

## 2023-12-28 NOTE — TELEPHONE ENCOUNTER
Thanks, if it is for her diabetes, her A1c is too low, they will not see her.   She can call them if she would like to confirm, but A1c of 7.4, they do not typically accept.

## 2024-01-07 DIAGNOSIS — B37.2 CANDIDAL INTERTRIGO: ICD-10-CM

## 2024-01-08 RX ORDER — NYSTATIN 100000 U/G
CREAM TOPICAL
Qty: 30 G | Refills: 0 | Status: SHIPPED | OUTPATIENT
Start: 2024-01-08

## 2024-03-28 ENCOUNTER — APPOINTMENT (OUTPATIENT)
Dept: PRIMARY CARE | Facility: CLINIC | Age: 74
End: 2024-03-28
Payer: MEDICARE

## 2024-03-28 ENCOUNTER — LAB (OUTPATIENT)
Dept: LAB | Facility: LAB | Age: 74
End: 2024-03-28
Payer: MEDICARE

## 2024-03-28 DIAGNOSIS — E11.65 TYPE 2 DIABETES MELLITUS WITH HYPERGLYCEMIA, WITHOUT LONG-TERM CURRENT USE OF INSULIN (MULTI): ICD-10-CM

## 2024-03-28 LAB
ANION GAP SERPL CALC-SCNC: 10 MMOL/L (ref 10–20)
BUN SERPL-MCNC: 18 MG/DL (ref 6–23)
CALCIUM SERPL-MCNC: 9.4 MG/DL (ref 8.6–10.3)
CHLORIDE SERPL-SCNC: 102 MMOL/L (ref 98–107)
CO2 SERPL-SCNC: 32 MMOL/L (ref 21–32)
CREAT SERPL-MCNC: 0.7 MG/DL (ref 0.5–1.05)
EGFRCR SERPLBLD CKD-EPI 2021: 90 ML/MIN/1.73M*2
EST. AVERAGE GLUCOSE BLD GHB EST-MCNC: 148 MG/DL
GLUCOSE SERPL-MCNC: 139 MG/DL (ref 74–99)
HBA1C MFR BLD: 6.8 %
POTASSIUM SERPL-SCNC: 4.1 MMOL/L (ref 3.5–5.3)
SODIUM SERPL-SCNC: 140 MMOL/L (ref 136–145)

## 2024-03-28 PROCEDURE — 83036 HEMOGLOBIN GLYCOSYLATED A1C: CPT

## 2024-03-28 PROCEDURE — 80048 BASIC METABOLIC PNL TOTAL CA: CPT

## 2024-03-28 PROCEDURE — 36415 COLL VENOUS BLD VENIPUNCTURE: CPT

## 2024-04-02 ENCOUNTER — OFFICE VISIT (OUTPATIENT)
Dept: PRIMARY CARE | Facility: CLINIC | Age: 74
End: 2024-04-02
Payer: MEDICARE

## 2024-04-02 VITALS
BODY MASS INDEX: 30.46 KG/M2 | WEIGHT: 201 LBS | HEIGHT: 68 IN | DIASTOLIC BLOOD PRESSURE: 80 MMHG | SYSTOLIC BLOOD PRESSURE: 130 MMHG | HEART RATE: 72 BPM

## 2024-04-02 DIAGNOSIS — F41.9 ANXIETY: ICD-10-CM

## 2024-04-02 DIAGNOSIS — E11.65 TYPE 2 DIABETES MELLITUS WITH HYPERGLYCEMIA, WITHOUT LONG-TERM CURRENT USE OF INSULIN (MULTI): ICD-10-CM

## 2024-04-02 DIAGNOSIS — I10 BENIGN HYPERTENSION: ICD-10-CM

## 2024-04-02 DIAGNOSIS — E78.49 OTHER HYPERLIPIDEMIA: ICD-10-CM

## 2024-04-02 DIAGNOSIS — Z00.00 ROUTINE GENERAL MEDICAL EXAMINATION AT HEALTH CARE FACILITY: Primary | ICD-10-CM

## 2024-04-02 DIAGNOSIS — Z11.59 NEED FOR HEPATITIS C SCREENING TEST: ICD-10-CM

## 2024-04-02 PROCEDURE — 1159F MED LIST DOCD IN RCRD: CPT | Performed by: FAMILY MEDICINE

## 2024-04-02 PROCEDURE — 3079F DIAST BP 80-89 MM HG: CPT | Performed by: FAMILY MEDICINE

## 2024-04-02 PROCEDURE — 1123F ACP DISCUSS/DSCN MKR DOCD: CPT | Performed by: FAMILY MEDICINE

## 2024-04-02 PROCEDURE — G0439 PPPS, SUBSEQ VISIT: HCPCS | Performed by: FAMILY MEDICINE

## 2024-04-02 PROCEDURE — 3044F HG A1C LEVEL LT 7.0%: CPT | Performed by: FAMILY MEDICINE

## 2024-04-02 PROCEDURE — 1036F TOBACCO NON-USER: CPT | Performed by: FAMILY MEDICINE

## 2024-04-02 PROCEDURE — 1160F RVW MEDS BY RX/DR IN RCRD: CPT | Performed by: FAMILY MEDICINE

## 2024-04-02 PROCEDURE — 1170F FXNL STATUS ASSESSED: CPT | Performed by: FAMILY MEDICINE

## 2024-04-02 PROCEDURE — 3075F SYST BP GE 130 - 139MM HG: CPT | Performed by: FAMILY MEDICINE

## 2024-04-02 PROCEDURE — 99214 OFFICE O/P EST MOD 30 MIN: CPT | Performed by: FAMILY MEDICINE

## 2024-04-02 ASSESSMENT — ACTIVITIES OF DAILY LIVING (ADL)
DOING_HOUSEWORK: INDEPENDENT
TAKING_MEDICATION: INDEPENDENT
DRESSING: INDEPENDENT
MANAGING_FINANCES: INDEPENDENT
BATHING: INDEPENDENT
GROCERY_SHOPPING: INDEPENDENT

## 2024-04-02 ASSESSMENT — PATIENT HEALTH QUESTIONNAIRE - PHQ9
2. FEELING DOWN, DEPRESSED OR HOPELESS: NOT AT ALL
SUM OF ALL RESPONSES TO PHQ9 QUESTIONS 1 AND 2: 0
1. LITTLE INTEREST OR PLEASURE IN DOING THINGS: NOT AT ALL

## 2024-04-02 NOTE — PROGRESS NOTES
"Subjective   Reason for Visit: Aayush Venegas is an 73 y.o. female here for a Medicare Wellness visit.     Past Medical, Surgical, and Family History reviewed and updated in chart.    Reviewed all medications by prescribing practitioner or clinical pharmacist (such as prescriptions, OTCs, herbal therapies and supplements) and documented in the medical record.    HPI  Dm, improved, A1c down to 6.8, states has been working on diet  HTN, stable  Hyperlipidemia, has been stable  History of vaginal dysplasia, follows gyn  States no current concerns . Cuticles get dry, nails stained from nail polish.  Patient Care Team:  Deonna Guzman MD as PCP - General (Family Medicine)  Deonna Guzman MD as PCP - [employer] PCP     Review of Systems   All other systems reviewed and are negative.      Objective   Vitals:  /80 (BP Location: Left arm, Patient Position: Sitting)   Pulse 72   Ht 1.727 m (5' 8\")   Wt 91.2 kg (201 lb)   BMI 30.56 kg/m²       Physical Exam  Vitals and nursing note reviewed.   Constitutional:       General: She is not in acute distress.     Appearance: Normal appearance. She is not toxic-appearing.   HENT:      Head: Normocephalic and atraumatic.      Right Ear: External ear normal.   Cardiovascular:      Rate and Rhythm: Normal rate and regular rhythm.      Heart sounds: No murmur heard.  Pulmonary:      Effort: Pulmonary effort is normal.      Breath sounds: Normal breath sounds.   Abdominal:      Palpations: Abdomen is soft.   Musculoskeletal:      Cervical back: Neck supple. No rigidity.      Comments:     Skin:     General: Skin is warm and dry.      Comments: Distal fingernails yellowed, proximal clear c/w staining   Neurological:      General: No focal deficit present.      Mental Status: She is alert and oriented to person, place, and time.   Psychiatric:         Mood and Affect: Mood normal.         Behavior: Behavior normal.       Lab on 03/28/2024   Component Date Value " Ref Range Status    Hemoglobin A1C 03/28/2024 6.8 (H)  see below % Final    Estimated Average Glucose 03/28/2024 148  Not Established mg/dL Final    Glucose 03/28/2024 139 (H)  74 - 99 mg/dL Final    Sodium 03/28/2024 140  136 - 145 mmol/L Final    Potassium 03/28/2024 4.1  3.5 - 5.3 mmol/L Final    Chloride 03/28/2024 102  98 - 107 mmol/L Final    Bicarbonate 03/28/2024 32  21 - 32 mmol/L Final    Anion Gap 03/28/2024 10  10 - 20 mmol/L Final    Urea Nitrogen 03/28/2024 18  6 - 23 mg/dL Final    Creatinine 03/28/2024 0.70  0.50 - 1.05 mg/dL Final    eGFR 03/28/2024 90  >60 mL/min/1.73m*2 Final    Calculations of estimated GFR are performed using the 2021 CKD-EPI Study Refit equation without the race variable for the IDMS-Traceable creatinine methods.  https://jasn.asnjournals.org/content/early/2021/09/22/ASN.0793831489    Calcium 03/28/2024 9.4  8.6 - 10.3 mg/dL Final         Assessment/Plan   Problem List Items Addressed This Visit       Anxiety    Benign hypertension    Diabetes mellitus with hyperglycemia, without long-term current use of insulin (CMS/[facility])    Relevant Orders    CBC and Auto Differential    Comprehensive Metabolic Panel    Hemoglobin A1C    Lipid Panel    TSH with reflex to Free T4 if abnormal    Microscopic Only, Urine    Vitamin B12    Albumin , Urine Random    Hyperlipidemia     Other Visit Diagnoses       Routine general medical examination at health care facility    -  Primary

## 2024-04-18 DIAGNOSIS — E11.65 TYPE 2 DIABETES MELLITUS WITH HYPERGLYCEMIA, WITHOUT LONG-TERM CURRENT USE OF INSULIN (MULTI): ICD-10-CM

## 2024-04-18 RX ORDER — METFORMIN HYDROCHLORIDE 500 MG/1
500 TABLET ORAL
Qty: 180 TABLET | Refills: 1 | Status: SHIPPED | OUTPATIENT
Start: 2024-04-18

## 2024-04-24 ENCOUNTER — APPOINTMENT (OUTPATIENT)
Dept: GYNECOLOGIC ONCOLOGY | Facility: CLINIC | Age: 74
End: 2024-04-24
Payer: MEDICARE

## 2024-05-07 ENCOUNTER — APPOINTMENT (OUTPATIENT)
Dept: GYNECOLOGIC ONCOLOGY | Facility: CLINIC | Age: 74
End: 2024-05-07
Payer: MEDICARE

## 2024-05-15 ENCOUNTER — APPOINTMENT (OUTPATIENT)
Dept: GYNECOLOGIC ONCOLOGY | Facility: CLINIC | Age: 74
End: 2024-05-15
Payer: MEDICARE

## 2024-05-20 ENCOUNTER — OFFICE VISIT (OUTPATIENT)
Dept: PRIMARY CARE | Facility: CLINIC | Age: 74
End: 2024-05-20
Payer: MEDICARE

## 2024-05-20 VITALS
HEART RATE: 68 BPM | HEIGHT: 68 IN | OXYGEN SATURATION: 95 % | SYSTOLIC BLOOD PRESSURE: 138 MMHG | WEIGHT: 198 LBS | BODY MASS INDEX: 30.01 KG/M2 | DIASTOLIC BLOOD PRESSURE: 78 MMHG

## 2024-05-20 DIAGNOSIS — R05.1 ACUTE COUGH: Primary | ICD-10-CM

## 2024-05-20 PROCEDURE — 3044F HG A1C LEVEL LT 7.0%: CPT | Performed by: FAMILY MEDICINE

## 2024-05-20 PROCEDURE — 1160F RVW MEDS BY RX/DR IN RCRD: CPT | Performed by: FAMILY MEDICINE

## 2024-05-20 PROCEDURE — 3078F DIAST BP <80 MM HG: CPT | Performed by: FAMILY MEDICINE

## 2024-05-20 PROCEDURE — 99213 OFFICE O/P EST LOW 20 MIN: CPT | Performed by: FAMILY MEDICINE

## 2024-05-20 PROCEDURE — 1159F MED LIST DOCD IN RCRD: CPT | Performed by: FAMILY MEDICINE

## 2024-05-20 PROCEDURE — 3075F SYST BP GE 130 - 139MM HG: CPT | Performed by: FAMILY MEDICINE

## 2024-05-20 PROCEDURE — 1036F TOBACCO NON-USER: CPT | Performed by: FAMILY MEDICINE

## 2024-05-20 RX ORDER — BENZONATATE 100 MG/1
100 CAPSULE ORAL 3 TIMES DAILY PRN
Qty: 42 CAPSULE | Refills: 0 | Status: SHIPPED | OUTPATIENT
Start: 2024-05-20 | End: 2024-06-19

## 2024-05-20 RX ORDER — PREDNISONE 10 MG/1
TABLET ORAL
Qty: 30 TABLET | Refills: 0 | Status: SHIPPED | OUTPATIENT
Start: 2024-05-20

## 2024-05-20 RX ORDER — ALBUTEROL SULFATE 90 UG/1
2 AEROSOL, METERED RESPIRATORY (INHALATION) EVERY 4 HOURS PRN
Qty: 8 G | Refills: 5 | Status: SHIPPED | OUTPATIENT
Start: 2024-05-20 | End: 2025-05-20

## 2024-05-20 RX ORDER — DOXYCYCLINE 100 MG/1
100 CAPSULE ORAL 2 TIMES DAILY
Qty: 14 CAPSULE | Refills: 0 | Status: SHIPPED | OUTPATIENT
Start: 2024-05-20 | End: 2024-05-27

## 2024-05-20 ASSESSMENT — ENCOUNTER SYMPTOMS: COUGH: 1

## 2024-05-20 NOTE — PROGRESS NOTES
"     Subjective  Trudence D Frames \"Linda\" is a 73 y.o. female who presents for Cough.  Cough       Has had almost ten days, travelling to [place], [place].   Had air conditioning blowing on them in the motels.    Runny nose, congestion, worse at night.  Productive cough, yellow.  Barky cough.  No fevers/chills.  No shortness of breath.  Had two days of diarrhea, and that has resolved.  Some sneezing fits.     Spouse being seen for similar symptoms.   Review of Systems   Respiratory:  Positive for cough.    .    Objective     Visit Vitals  /78 (BP Location: Left arm, Patient Position: Sitting)   Pulse 68      Physical Exam  Vitals reviewed.   Constitutional:       General: She is not in acute distress.     Appearance: She is not ill-appearing or toxic-appearing.   HENT:      Head: Normocephalic.   Cardiovascular:      Rate and Rhythm: Normal rate and regular rhythm.   Pulmonary:      Effort: Pulmonary effort is normal. No respiratory distress.      Breath sounds: No stridor. Wheezing present. No rhonchi or rales.      Comments: Speaking in complete sentences with no use of accessory muscles, 02 sat 95 on ra  Neurological:      General: No focal deficit present.      Mental Status: She is alert.   Psychiatric:         Mood and Affect: Mood normal.         Assessment/Plan   Problem List Items Addressed This Visit    None  Visit Diagnoses       Acute cough    -  Primary    Relevant Medications    predniSONE (Deltasone) 10 mg tablet    albuterol (Ventolin HFA) 90 mcg/actuation inhaler    doxycycline (Vibramycin) 100 mg capsule    benzonatate (Tessalon) 100 mg capsule             I think this is viral and will resolve without antibiotic.  Pt requests antibiotic sent in in case she doesn't get better.       Deonna Guzman MD   "

## 2024-05-31 DIAGNOSIS — I10 BENIGN HYPERTENSION: ICD-10-CM

## 2024-05-31 DIAGNOSIS — F41.9 ANXIETY: ICD-10-CM

## 2024-05-31 DIAGNOSIS — E78.49 OTHER HYPERLIPIDEMIA: ICD-10-CM

## 2024-05-31 RX ORDER — CITALOPRAM 20 MG/1
20 TABLET, FILM COATED ORAL DAILY
Qty: 90 TABLET | Refills: 1 | Status: SHIPPED | OUTPATIENT
Start: 2024-05-31

## 2024-05-31 RX ORDER — BISOPROLOL FUMARATE AND HYDROCHLOROTHIAZIDE 5; 6.25 MG/1; MG/1
1 TABLET ORAL DAILY
Qty: 90 TABLET | Refills: 1 | Status: SHIPPED | OUTPATIENT
Start: 2024-05-31

## 2024-05-31 RX ORDER — ATORVASTATIN CALCIUM 10 MG/1
10 TABLET, FILM COATED ORAL DAILY
Qty: 90 TABLET | Refills: 1 | Status: SHIPPED | OUTPATIENT
Start: 2024-05-31

## 2024-06-03 DIAGNOSIS — E11.65 TYPE 2 DIABETES MELLITUS WITH HYPERGLYCEMIA, WITHOUT LONG-TERM CURRENT USE OF INSULIN (MULTI): ICD-10-CM

## 2024-06-03 RX ORDER — BLOOD-GLUCOSE METER
KIT MISCELLANEOUS
Qty: 100 EACH | Refills: 1 | Status: SHIPPED | OUTPATIENT
Start: 2024-06-03

## 2024-06-04 ENCOUNTER — APPOINTMENT (OUTPATIENT)
Dept: PRIMARY CARE | Facility: CLINIC | Age: 74
End: 2024-06-04
Payer: MEDICARE

## 2024-06-05 ENCOUNTER — OFFICE VISIT (OUTPATIENT)
Dept: GYNECOLOGIC ONCOLOGY | Facility: CLINIC | Age: 74
End: 2024-06-05
Payer: MEDICARE

## 2024-06-05 VITALS
TEMPERATURE: 97.3 F | DIASTOLIC BLOOD PRESSURE: 84 MMHG | OXYGEN SATURATION: 96 % | SYSTOLIC BLOOD PRESSURE: 128 MMHG | RESPIRATION RATE: 17 BRPM | HEART RATE: 66 BPM | BODY MASS INDEX: 29.46 KG/M2 | WEIGHT: 193.78 LBS

## 2024-06-05 DIAGNOSIS — N89.3 VAGINAL DYSPLASIA: Primary | ICD-10-CM

## 2024-06-05 PROCEDURE — 3074F SYST BP LT 130 MM HG: CPT | Performed by: NURSE PRACTITIONER

## 2024-06-05 PROCEDURE — 99213 OFFICE O/P EST LOW 20 MIN: CPT | Performed by: NURSE PRACTITIONER

## 2024-06-05 PROCEDURE — 3044F HG A1C LEVEL LT 7.0%: CPT | Performed by: NURSE PRACTITIONER

## 2024-06-05 PROCEDURE — 3079F DIAST BP 80-89 MM HG: CPT | Performed by: NURSE PRACTITIONER

## 2024-06-05 PROCEDURE — 1126F AMNT PAIN NOTED NONE PRSNT: CPT | Performed by: NURSE PRACTITIONER

## 2024-06-05 PROCEDURE — 1159F MED LIST DOCD IN RCRD: CPT | Performed by: NURSE PRACTITIONER

## 2024-06-05 ASSESSMENT — PAIN SCALES - GENERAL: PAINLEVEL: 0-NO PAIN

## 2024-06-05 NOTE — PROGRESS NOTES
Patient ID: Linda Venegas is a 73 y.o. female.  Primary Care Provider: Deonna Guzman MD    Subjective    HPI: 71 y/o with vaginal dysplasia.     10/19 LSIL  6/20 LSIL, cannot exclude HSIL, HPV 16 positive, bx negative  8/20 VAIN 1  4/21 HSIL pap HPV 16 positive  10/11/21 laser ablation of vagina.    6/2022 PAP LSIL cannot rule out HSIL, HPV 16+, HPV non 16/18 +    PMH: HTN, HLD, T2DM, asthma, anxiety, arthritis  PSH: Breast biopsy, knee replacement, hysterectomy, tonsillectomy  Social history: Denies use of alcohol, tobacco and other drug use.       Objective  Visit Vitals  /84 (BP Location: Right arm, Patient Position: Sitting, BP Cuff Size: Adult)   Pulse 66   Temp 36.3 °C (97.3 °F) (Temporal)   Resp 17         Interval history:  Patient is a 74 y/o with vaginal dysplasia, persistent after laser ablation. Last pap 10/23 HGSIL, HPV 16+.   Vaginal cuff biopsy was negative.  Here for 6 month follow up.  Been using Estrace cream when she remembers.   Patient denies any vaginal bleeding, pink tinged discharge. Patient denies any constipation or diarrhea.  Appetite has been good.  Energy levels are baseline.  Patient denies hematuria.  Patient denies urinary leakage or incontinence. Patient is not currently sexually active,  has medical issues.  Mammogram is up to date,     Physical Exam:    Constitutional: Doing well. RAFAELA  Eyes: PERRL  ENMT: Moist mucus membranes  Head/Neck: Supple. Symmetrical  Cardiovascular: Regular, rate and rhythm. 2+ equal pulses of the extremities  Respiratory/Thorax: CTA. RRR. Chest rise symmetrical.  Gastrointestinal: Non-distended, soft, non-tender  Genitourinary:   Normal external female genitalia. No vulvar lesions noted  Speculum exam: Smooth vaginal walls without lesions or masses. Vaginal cuff visualized without lesions. Surgically absent cervix.  Atrophic changes to vaginal walls.   Bimanual exam: Smooth vaginal wall without lesions or masses.  Surgically absent uterus,  cervix, and adnexa.  Rectovaginal exam: smooth rectovaginal septum without lesions or masses  Musculoskeletal: ROM intact, no joint swelling, normal strength  Extremities: No edema  Neurological: Alert and oriented x 3. Pleasant and cooperative.  Lymphatic: No lymphadenopathy. No lymphedema  Psychological: Appropriate mood and behavior  Skin: Warm and dry, no lesions, no rashes    A complete review of systems was performed and all systems were normal except what is noted in the interval history.      Assessment/Plan Patient is a 72 y/o with vaginal dysplasia, persistent after laser ablation. Improving atrophic changes.       PLAN:  F/U in 6 months or as needed  Pap next visit  Physical examination was within normal limits today.  She is currently RAFAELA.  We reviewed signs and symptoms of possible recurrence with the patient and she will call our office should she experience any of these.

## 2024-09-30 ENCOUNTER — LAB (OUTPATIENT)
Facility: LAB | Age: 74
End: 2024-09-30
Payer: MEDICARE

## 2024-09-30 DIAGNOSIS — E11.65 TYPE 2 DIABETES MELLITUS WITH HYPERGLYCEMIA, WITHOUT LONG-TERM CURRENT USE OF INSULIN: ICD-10-CM

## 2024-09-30 DIAGNOSIS — Z11.59 NEED FOR HEPATITIS C SCREENING TEST: ICD-10-CM

## 2024-09-30 LAB
ALBUMIN SERPL BCP-MCNC: 4.6 G/DL (ref 3.4–5)
ALP SERPL-CCNC: 105 U/L (ref 33–136)
ALT SERPL W P-5'-P-CCNC: 20 U/L (ref 7–45)
ANION GAP SERPL CALC-SCNC: 13 MMOL/L (ref 10–20)
AST SERPL W P-5'-P-CCNC: 23 U/L (ref 9–39)
BASOPHILS # BLD AUTO: 0.04 X10*3/UL (ref 0–0.1)
BASOPHILS NFR BLD AUTO: 0.6 %
BILIRUB SERPL-MCNC: 0.7 MG/DL (ref 0–1.2)
BUN SERPL-MCNC: 18 MG/DL (ref 6–23)
CALCIUM SERPL-MCNC: 9.3 MG/DL (ref 8.6–10.3)
CHLORIDE SERPL-SCNC: 102 MMOL/L (ref 98–107)
CHOLEST SERPL-MCNC: 170 MG/DL (ref 0–199)
CHOLESTEROL/HDL RATIO: 3
CO2 SERPL-SCNC: 28 MMOL/L (ref 21–32)
CREAT SERPL-MCNC: 0.74 MG/DL (ref 0.5–1.05)
CREAT UR-MCNC: 82.2 MG/DL (ref 20–320)
EGFRCR SERPLBLD CKD-EPI 2021: 84 ML/MIN/1.73M*2
EOSINOPHIL # BLD AUTO: 0.12 X10*3/UL (ref 0–0.4)
EOSINOPHIL NFR BLD AUTO: 1.9 %
ERYTHROCYTE [DISTWIDTH] IN BLOOD BY AUTOMATED COUNT: 11.7 % (ref 11.5–14.5)
EST. AVERAGE GLUCOSE BLD GHB EST-MCNC: 151 MG/DL
GLUCOSE SERPL-MCNC: 139 MG/DL (ref 74–99)
HBA1C MFR BLD: 6.9 %
HCT VFR BLD AUTO: 45.2 % (ref 36–46)
HCV AB SER QL: NONREACTIVE
HDLC SERPL-MCNC: 56 MG/DL
HGB BLD-MCNC: 14.9 G/DL (ref 12–16)
HYALINE CASTS #/AREA URNS AUTO: ABNORMAL /LPF
IMM GRANULOCYTES # BLD AUTO: 0.01 X10*3/UL (ref 0–0.5)
IMM GRANULOCYTES NFR BLD AUTO: 0.2 % (ref 0–0.9)
LDLC SERPL CALC-MCNC: 80 MG/DL
LYMPHOCYTES # BLD AUTO: 2.71 X10*3/UL (ref 0.8–3)
LYMPHOCYTES NFR BLD AUTO: 42.1 %
MCH RBC QN AUTO: 30.9 PG (ref 26–34)
MCHC RBC AUTO-ENTMCNC: 33 G/DL (ref 32–36)
MCV RBC AUTO: 94 FL (ref 80–100)
MICROALBUMIN UR-MCNC: <7 MG/L
MICROALBUMIN/CREAT UR: NORMAL MG/G{CREAT}
MONOCYTES # BLD AUTO: 0.43 X10*3/UL (ref 0.05–0.8)
MONOCYTES NFR BLD AUTO: 6.7 %
MUCOUS THREADS #/AREA URNS AUTO: ABNORMAL /LPF
NEUTROPHILS # BLD AUTO: 3.13 X10*3/UL (ref 1.6–5.5)
NEUTROPHILS NFR BLD AUTO: 48.5 %
NON HDL CHOLESTEROL: 114 MG/DL (ref 0–149)
NRBC BLD-RTO: 0 /100 WBCS (ref 0–0)
PLATELET # BLD AUTO: 182 X10*3/UL (ref 150–450)
POTASSIUM SERPL-SCNC: 4.4 MMOL/L (ref 3.5–5.3)
PROT SERPL-MCNC: 6.8 G/DL (ref 6.4–8.2)
RBC # BLD AUTO: 4.82 X10*6/UL (ref 4–5.2)
RBC #/AREA URNS AUTO: ABNORMAL /HPF
SODIUM SERPL-SCNC: 139 MMOL/L (ref 136–145)
SQUAMOUS #/AREA URNS AUTO: ABNORMAL /HPF
TRIGL SERPL-MCNC: 168 MG/DL (ref 0–149)
TSH SERPL-ACNC: 3.27 MIU/L (ref 0.44–3.98)
VIT B12 SERPL-MCNC: 2108 PG/ML (ref 211–911)
VLDL: 34 MG/DL (ref 0–40)
WBC # BLD AUTO: 6.4 X10*3/UL (ref 4.4–11.3)
WBC #/AREA URNS AUTO: ABNORMAL /HPF
WBC CLUMPS #/AREA URNS AUTO: ABNORMAL /HPF
YEAST BUDDING #/AREA UR COMP ASSIST: PRESENT /HPF

## 2024-09-30 PROCEDURE — 82570 ASSAY OF URINE CREATININE: CPT

## 2024-09-30 PROCEDURE — 36415 COLL VENOUS BLD VENIPUNCTURE: CPT

## 2024-09-30 PROCEDURE — 86803 HEPATITIS C AB TEST: CPT

## 2024-09-30 PROCEDURE — 80061 LIPID PANEL: CPT

## 2024-09-30 PROCEDURE — 83036 HEMOGLOBIN GLYCOSYLATED A1C: CPT

## 2024-09-30 PROCEDURE — 84443 ASSAY THYROID STIM HORMONE: CPT

## 2024-09-30 PROCEDURE — 82043 UR ALBUMIN QUANTITATIVE: CPT

## 2024-09-30 PROCEDURE — 85025 COMPLETE CBC W/AUTO DIFF WBC: CPT

## 2024-09-30 PROCEDURE — 80053 COMPREHEN METABOLIC PANEL: CPT

## 2024-09-30 PROCEDURE — 82607 VITAMIN B-12: CPT

## 2024-09-30 PROCEDURE — 81001 URINALYSIS AUTO W/SCOPE: CPT

## 2024-10-02 ENCOUNTER — TELEPHONE (OUTPATIENT)
Dept: GYNECOLOGIC ONCOLOGY | Facility: HOSPITAL | Age: 74
End: 2024-10-02

## 2024-10-02 ENCOUNTER — APPOINTMENT (OUTPATIENT)
Dept: PRIMARY CARE | Facility: CLINIC | Age: 74
End: 2024-10-02
Payer: MEDICARE

## 2024-10-02 VITALS
WEIGHT: 200 LBS | SYSTOLIC BLOOD PRESSURE: 126 MMHG | HEIGHT: 68 IN | BODY MASS INDEX: 30.31 KG/M2 | DIASTOLIC BLOOD PRESSURE: 76 MMHG | HEART RATE: 68 BPM

## 2024-10-02 DIAGNOSIS — J45.20 MILD INTERMITTENT ASTHMA WITHOUT COMPLICATION (HHS-HCC): ICD-10-CM

## 2024-10-02 DIAGNOSIS — Z12.31 SCREENING MAMMOGRAM FOR BREAST CANCER: Primary | ICD-10-CM

## 2024-10-02 DIAGNOSIS — Z00.00 HEALTHCARE MAINTENANCE: ICD-10-CM

## 2024-10-02 DIAGNOSIS — E11.65 TYPE 2 DIABETES MELLITUS WITH HYPERGLYCEMIA, WITHOUT LONG-TERM CURRENT USE OF INSULIN: ICD-10-CM

## 2024-10-02 DIAGNOSIS — K21.9 GASTROESOPHAGEAL REFLUX DISEASE WITHOUT ESOPHAGITIS: ICD-10-CM

## 2024-10-02 PROCEDURE — 3078F DIAST BP <80 MM HG: CPT | Performed by: FAMILY MEDICINE

## 2024-10-02 PROCEDURE — G2211 COMPLEX E/M VISIT ADD ON: HCPCS | Performed by: FAMILY MEDICINE

## 2024-10-02 PROCEDURE — 1159F MED LIST DOCD IN RCRD: CPT | Performed by: FAMILY MEDICINE

## 2024-10-02 PROCEDURE — 1036F TOBACCO NON-USER: CPT | Performed by: FAMILY MEDICINE

## 2024-10-02 PROCEDURE — 3074F SYST BP LT 130 MM HG: CPT | Performed by: FAMILY MEDICINE

## 2024-10-02 PROCEDURE — 1160F RVW MEDS BY RX/DR IN RCRD: CPT | Performed by: FAMILY MEDICINE

## 2024-10-02 PROCEDURE — 99214 OFFICE O/P EST MOD 30 MIN: CPT | Performed by: FAMILY MEDICINE

## 2024-10-02 RX ORDER — PANTOPRAZOLE SODIUM 40 MG/1
40 TABLET, DELAYED RELEASE ORAL DAILY
Qty: 90 TABLET | Refills: 3 | Status: SHIPPED | OUTPATIENT
Start: 2024-10-02

## 2024-10-02 NOTE — PROGRESS NOTES
"Patient presents for periodic surveillance of chronic medical problems.     Subjective  Trudence D Frames \"Linda\" is a 74 y.o. female who presents for Follow-up.  HPI  Dm, well controlled  Hyperlipidemia, on statin  Sees gyn for vaginal dysplasia.  Due for mammogram  Anxiety, asthma, stable  Htn, stable, gets good readings at home\  GERD, was on protonix, off now and symptoms recurred.  Due for mammogram.  Declines influenza vaccine.   Review of Systems   All other systems reviewed and are negative.  .    Allergies   Allergen Reactions    Diphenhydramine Hcl Unknown    Nyquil Liquicaps Unknown    Penicillins Unknown    Sulfa (Sulfonamide Antibiotics) Unknown     tingling all over    Sulfamethoxazole Unknown    Tree Nuts Other     walnuts    Drew Unknown       Current Outpatient Medications on File Prior to Visit   Medication Sig Dispense Refill    albuterol (Ventolin HFA) 90 mcg/actuation inhaler Inhale 2 puffs every 4 hours if needed for wheezing or shortness of breath. 8 g 5    atorvastatin (Lipitor) 10 mg tablet Take 1 tablet by mouth once daily 90 tablet 1    bisoproloL-hydrochlorothiazide (Ziac) 5-6.25 mg tablet Take 1 tablet by mouth once daily 90 tablet 1    cholecalciferol (Vitamin D-3) 50 MCG (2000 UT) tablet Take by mouth.      citalopram (CeleXA) 20 mg tablet Take 1 tablet by mouth once daily 90 tablet 1    CYANOCOBALAMIN, VITAMIN B-12, ORAL Take by mouth. TABS      estradiol (Estrace) 0.01 % (0.1 mg/gram) vaginal cream Dime to nickel size amount on your finger and apply to directed area.  3 nights a week at bedtime 42.5 g 3    fluticasone (Flonase) 50 mcg/actuation nasal spray Administer into affected nostril(s). LR      FreeStyle Lite Strips strip USE 1 STRIP TO CHECK GLUCOSE ONCE DAILY 100 each 1    lancets misc Check fasting blood sugar once daily      metFORMIN (Glucophage) 500 mg tablet Take 1 tablet (500 mg) by mouth 2 times a day after meals. 180 tablet 1    multivitamin tablet Take by mouth.   "    nystatin (Mycostatin) cream APPLY  CREAM  TO AFFECTED AREAS  THREE TIMES DAILY FOR 7 DAYS 30 g 0    vitamin E acetate (VITAMIN E ORAL) Take by mouth. Vitamin E CAPS      [DISCONTINUED] meloxicam (Mobic) 15 mg tablet One daily as needed for pain.  Take with food. 30 tablet 0    [DISCONTINUED] pantoprazole (ProtoNix) 40 mg EC tablet Take 1 tablet (40 mg) by mouth once daily.      [DISCONTINUED] albuterol 90 mcg/actuation inhaler Inhale 2 puffs every 4 hours if needed.      [DISCONTINUED] predniSONE (Deltasone) 10 mg tablet 4 tabs daily for 3 days, 3 tabs daily for 3 days, 2 tabs daily for 3 days, 1 tab daily for 3 days, then discontinue 30 tablet 0     No current facility-administered medications on file prior to visit.       Patient Active Problem List   Diagnosis    Anxiety    Asthma, mild (HHS-HCC)    Benign hypertension    Diabetes mellitus with hyperglycemia, without long-term current use of insulin    Herpes simplex type 2 infection    Hyperlipidemia    Insomnia, controlled    Seasonal allergies    Vaginal dysplasia    Gastroesophageal reflux disease without esophagitis       Objective     Visit Vitals  /76 Comment: left arm seated large cuff   Pulse 68      Physical Exam  Vitals and nursing note reviewed.   Constitutional:       General: She is not in acute distress.     Appearance: Normal appearance. She is not toxic-appearing.   HENT:      Head: Normocephalic and atraumatic.   Cardiovascular:      Rate and Rhythm: Normal rate and regular rhythm.      Heart sounds: No murmur heard.  Pulmonary:      Effort: Pulmonary effort is normal.      Breath sounds: Normal breath sounds.   Musculoskeletal:      Cervical back: Neck supple. No rigidity.      Comments:     Skin:     General: Skin is warm and dry.   Neurological:      General: No focal deficit present.      Mental Status: She is alert and oriented to person, place, and time.   Psychiatric:         Mood and Affect: Mood normal.         Behavior:  Behavior normal.       Lab on 09/30/2024   Component Date Value Ref Range Status    WBC 09/30/2024 6.4  4.4 - 11.3 x10*3/uL Final    nRBC 09/30/2024 0.0  0.0 - 0.0 /100 WBCs Final    RBC 09/30/2024 4.82  4.00 - 5.20 x10*6/uL Final    Hemoglobin 09/30/2024 14.9  12.0 - 16.0 g/dL Final    Hematocrit 09/30/2024 45.2  36.0 - 46.0 % Final    MCV 09/30/2024 94  80 - 100 fL Final    MCH 09/30/2024 30.9  26.0 - 34.0 pg Final    MCHC 09/30/2024 33.0  32.0 - 36.0 g/dL Final    RDW 09/30/2024 11.7  11.5 - 14.5 % Final    Platelets 09/30/2024 182  150 - 450 x10*3/uL Final    Neutrophils % 09/30/2024 48.5  40.0 - 80.0 % Final    Immature Granulocytes %, Automated 09/30/2024 0.2  0.0 - 0.9 % Final    Immature Granulocyte Count (IG) includes promyelocytes, myelocytes and metamyelocytes but does not include bands. Percent differential counts (%) should be interpreted in the context of the absolute cell counts (cells/UL).    Lymphocytes % 09/30/2024 42.1  13.0 - 44.0 % Final    Monocytes % 09/30/2024 6.7  2.0 - 10.0 % Final    Eosinophils % 09/30/2024 1.9  0.0 - 6.0 % Final    Basophils % 09/30/2024 0.6  0.0 - 2.0 % Final    Neutrophils Absolute 09/30/2024 3.13  1.60 - 5.50 x10*3/uL Final    Percent differential counts (%) should be interpreted in the context of the absolute cell counts (cells/uL).    Immature Granulocytes Absolute, Au* 09/30/2024 0.01  0.00 - 0.50 x10*3/uL Final    Lymphocytes Absolute 09/30/2024 2.71  0.80 - 3.00 x10*3/uL Final    Monocytes Absolute 09/30/2024 0.43  0.05 - 0.80 x10*3/uL Final    Eosinophils Absolute 09/30/2024 0.12  0.00 - 0.40 x10*3/uL Final    Basophils Absolute 09/30/2024 0.04  0.00 - 0.10 x10*3/uL Final    Glucose 09/30/2024 139 (H)  74 - 99 mg/dL Final    Sodium 09/30/2024 139  136 - 145 mmol/L Final    Potassium 09/30/2024 4.4  3.5 - 5.3 mmol/L Final    Chloride 09/30/2024 102  98 - 107 mmol/L Final    Bicarbonate 09/30/2024 28  21 - 32 mmol/L Final    Anion Gap 09/30/2024 13  10 - 20 mmol/L  Final    Urea Nitrogen 09/30/2024 18  6 - 23 mg/dL Final    Creatinine 09/30/2024 0.74  0.50 - 1.05 mg/dL Final    eGFR 09/30/2024 84  >60 mL/min/1.73m*2 Final    Calculations of estimated GFR are performed using the 2021 CKD-EPI Study Refit equation without the race variable for the IDMS-Traceable creatinine methods.  https://jasn.asnjournals.org/content/early/2021/09/22/ASN.1409255185    Calcium 09/30/2024 9.3  8.6 - 10.3 mg/dL Final    Albumin 09/30/2024 4.6  3.4 - 5.0 g/dL Final    Alkaline Phosphatase 09/30/2024 105  33 - 136 U/L Final    Total Protein 09/30/2024 6.8  6.4 - 8.2 g/dL Final    AST 09/30/2024 23  9 - 39 U/L Final    Bilirubin, Total 09/30/2024 0.7  0.0 - 1.2 mg/dL Final    ALT 09/30/2024 20  7 - 45 U/L Final    Patients treated with Sulfasalazine may generate falsely decreased results for ALT.    Hemoglobin A1C 09/30/2024 6.9 (H)  See comment % Final    Estimated Average Glucose 09/30/2024 151  Not Established mg/dL Final    Cholesterol 09/30/2024 170  0 - 199 mg/dL Final          Age      Desirable   Borderline High   High     0-19 Y     0 - 169       170 - 199     >/= 200    20-24 Y     0 - 189       190 - 224     >/= 225         >24 Y     0 - 199       200 - 239     >/= 240   **All ranges are based on fasting samples. Specific   therapeutic targets will vary based on patient-specific   cardiac risk.    Pediatric guidelines reference:Pediatrics 2011, 128(S5).Adult guidelines reference: NCEP ATPIII Guidelines,RHINA 2001, 258:2486-97    Venipuncture immediately after or during the administration of Metamizole may lead to falsely low results. Testing should be performed immediately prior to Metamizole dosing.    HDL-Cholesterol 09/30/2024 56.0  mg/dL Final      Age       Very Low   Low     Normal    High    0-19 Y    < 35      < 40     40-45     ----  20-24 Y    ----     < 40      >45      ----        >24 Y      ----     < 40     40-60      >60      Cholesterol/HDL Ratio 09/30/2024 3.0   Final       Ref Values  Desirable  < 3.4  High Risk  > 5.0    LDL Calculated 09/30/2024 80  <=99 mg/dL Final                                Near   Borderline      AGE      Desirable  Optimal    High     High     Very High     0-19 Y     0 - 109     ---    110-129   >/= 130     ----    20-24 Y     0 - 119     ---    120-159   >/= 160     ----      >24 Y     0 -  99   100-129  130-159   160-189     >/=190      VLDL 09/30/2024 34  0 - 40 mg/dL Final    Triglycerides 09/30/2024 168 (H)  0 - 149 mg/dL Final       Age         Desirable   Borderline High   High     Very High   0 D-90 D    19 - 174         ----         ----        ----  91 D- 9 Y     0 -  74        75 -  99     >/= 100      ----    10-19 Y     0 -  89        90 - 129     >/= 130      ----    20-24 Y     0 - 114       115 - 149     >/= 150      ----         >24 Y     0 - 149       150 - 199    200- 499    >/= 500    Venipuncture immediately after or during the administration of Metamizole may lead to falsely low results. Testing should be performed immediately prior to Metamizole dosing.    Non HDL Cholesterol 09/30/2024 114  0 - 149 mg/dL Final          Age       Desirable   Borderline High   High     Very High     0-19 Y     0 - 119       120 - 144     >/= 145    >/= 160    20-24 Y     0 - 149       150 - 189     >/= 190      ----         >24 Y    30 mg/dL above LDL Cholesterol goal      Thyroid Stimulating Hormone 09/30/2024 3.27  0.44 - 3.98 mIU/L Final    WBC, Urine 09/30/2024 1-5  1-5, NONE /HPF Final    WBC Clumps, Urine 09/30/2024 RARE  Reference range not established. /HPF Final    RBC, Urine 09/30/2024 1-2  NONE, 1-2, 3-5 /HPF Final    Squamous Epithelial Cells, Urine 09/30/2024 1-9 (SPARSE)  Reference range not established. /HPF Final    Budding Yeast, Urine 09/30/2024 PRESENT (A)  NONE /HPF Final    Mucus, Urine 09/30/2024 FEW  Reference range not established. /LPF Final    Hyaline Casts, Urine 09/30/2024 OCCASIONAL (A)  NONE /LPF Final    Vitamin B12  09/30/2024 2,108 (H)  211 - 911 pg/mL Final    Albumin, Urine Random 09/30/2024 <7.0  Not established mg/L Final    Creatinine, Urine Random 09/30/2024 82.2  20.0 - 320.0 mg/dL Final    Albumin/Creatinine Ratio 09/30/2024    Final    One or more analytes used in this calculation is outside of the analytical measurement range. Calculation cannot be performed.    Hepatitis C AB 09/30/2024 Nonreactive  Nonreactive Final    Results from patients taking biotin supplements or receiving high-dose biotin therapy should be interpreted with caution due to possible interference with this test. Providers may contact their local laboratory for further information.        Assessment/Plan   Problem List Items Addressed This Visit       Asthma, mild (HHS-HCC)    Diabetes mellitus with hyperglycemia, without long-term current use of insulin    Relevant Orders    Comprehensive Metabolic Panel    Hemoglobin A1C    Gastroesophageal reflux disease without esophagitis     Other Visit Diagnoses       Screening mammogram for breast cancer    -  Primary    Relevant Orders    BI mammo bilateral screening tomosynthesis             Continue current care, call concerns.  Follow up six months, labs prior.       Deonna Guzman MD

## 2024-10-08 ENCOUNTER — HOSPITAL ENCOUNTER (OUTPATIENT)
Dept: RADIOLOGY | Facility: HOSPITAL | Age: 74
Discharge: HOME | End: 2024-10-08
Payer: MEDICARE

## 2024-10-08 DIAGNOSIS — Z12.31 SCREENING MAMMOGRAM FOR BREAST CANCER: ICD-10-CM

## 2024-10-08 PROCEDURE — 77063 BREAST TOMOSYNTHESIS BI: CPT | Performed by: RADIOLOGY

## 2024-10-08 PROCEDURE — 77067 SCR MAMMO BI INCL CAD: CPT | Performed by: RADIOLOGY

## 2024-10-08 PROCEDURE — 77067 SCR MAMMO BI INCL CAD: CPT

## 2024-10-10 DIAGNOSIS — E11.65 TYPE 2 DIABETES MELLITUS WITH HYPERGLYCEMIA, WITHOUT LONG-TERM CURRENT USE OF INSULIN: ICD-10-CM

## 2024-10-10 RX ORDER — METFORMIN HYDROCHLORIDE 500 MG/1
500 TABLET ORAL
Qty: 180 TABLET | Refills: 1 | Status: SHIPPED | OUTPATIENT
Start: 2024-10-10

## 2024-11-24 DIAGNOSIS — F41.9 ANXIETY: ICD-10-CM

## 2024-11-24 DIAGNOSIS — I10 BENIGN HYPERTENSION: ICD-10-CM

## 2024-11-24 DIAGNOSIS — E78.49 OTHER HYPERLIPIDEMIA: ICD-10-CM

## 2024-11-25 RX ORDER — BISOPROLOL FUMARATE AND HYDROCHLOROTHIAZIDE 5; 6.25 MG/1; MG/1
1 TABLET ORAL DAILY
Qty: 90 TABLET | Refills: 1 | Status: SHIPPED | OUTPATIENT
Start: 2024-11-25

## 2024-11-25 RX ORDER — ATORVASTATIN CALCIUM 10 MG/1
10 TABLET, FILM COATED ORAL DAILY
Qty: 90 TABLET | Refills: 1 | Status: SHIPPED | OUTPATIENT
Start: 2024-11-25

## 2024-11-25 RX ORDER — CITALOPRAM 20 MG/1
20 TABLET, FILM COATED ORAL DAILY
Qty: 90 TABLET | Refills: 1 | Status: SHIPPED | OUTPATIENT
Start: 2024-11-25

## 2024-12-11 ENCOUNTER — APPOINTMENT (OUTPATIENT)
Dept: GYNECOLOGIC ONCOLOGY | Facility: CLINIC | Age: 74
End: 2024-12-11
Payer: MEDICARE

## 2024-12-19 DIAGNOSIS — E11.65 TYPE 2 DIABETES MELLITUS WITH HYPERGLYCEMIA, WITHOUT LONG-TERM CURRENT USE OF INSULIN: ICD-10-CM

## 2024-12-19 RX ORDER — BLOOD-GLUCOSE METER
KIT MISCELLANEOUS
Qty: 100 EACH | Refills: 1 | Status: SHIPPED | OUTPATIENT
Start: 2024-12-19

## 2024-12-26 DIAGNOSIS — E11.65 TYPE 2 DIABETES MELLITUS WITH HYPERGLYCEMIA, WITHOUT LONG-TERM CURRENT USE OF INSULIN: ICD-10-CM

## 2025-01-02 RX ORDER — BLOOD-GLUCOSE METER
KIT MISCELLANEOUS
Qty: 100 EACH | Refills: 0 | Status: SHIPPED | OUTPATIENT
Start: 2025-01-02

## 2025-01-03 NOTE — PROGRESS NOTES
Patient ID: Linda Venegas is a 73 y.o. female.  Primary Care Provider: Deonna Guzman MD    Subjective    HPI: 71 y/o with vaginal dysplasia.     10/19 LSIL  6/20 LSIL, cannot exclude HSIL, HPV 16 positive, bx negative  8/20 VAIN 1  4/21 HSIL pap HPV 16 positive  10/11/21 laser ablation of vagina.    6/2022 PAP LSIL cannot rule out HSIL, HPV 16+, HPV non 16/18 +    PMH: HTN, HLD, T2DM, asthma, anxiety, arthritis  PSH: Breast biopsy, knee replacement, hysterectomy, tonsillectomy  Social history: Denies use of alcohol, tobacco and other drug use.       Objective  Visit Vitals  /82   Pulse 67   Temp 36.4 °C (97.5 °F)   Resp 15       Interval history:  Patient is a 73 y/o with vaginal dysplasia, persistent after laser ablation. Last pap 10/23 HGSIL, HPV 16+.   Vaginal cuff biopsy was negative.  Here for 6 month follow up and pap.  Using Estrace a few times a week, feels improvement, doesn't feel she tears as easily when wiping.  Patient denies any vaginal bleeding, pink tinged discharge. Patient denies any constipation or diarrhea.  Appetite has been good.  Energy levels are baseline.  Patient denies hematuria.  Patient denies urinary leakage or incontinence. Mammogram is up to date.  Patient is not currently sexually active.      Physical Exam:    Constitutional: Doing well. RAFAELA  Eyes: PERRL  ENMT: Moist mucus membranes  Head/Neck: Supple. Symmetrical  Cardiovascular: Regular, rate and rhythm. 2+ equal pulses of the extremities  Respiratory/Thorax: CTA. RRR. Chest rise symmetrical.  Gastrointestinal: Non-distended, soft, non-tender  Genitourinary:   Normal external female genitalia. No vulvar lesions noted  Speculum exam: Smooth vaginal walls without lesions or masses. Vaginal cuff visualized without lesions. Surgically absent cervix.  Atrophic changes to vaginal walls.   Bimanual exam: Smooth vaginal wall without lesions or masses.  Surgically absent uterus, cervix, and adnexa.  Rectovaginal exam: smooth  rectovaginal septum without lesions or masses  Musculoskeletal: ROM intact, no joint swelling, normal strength  Extremities: No edema  Neurological: Alert and oriented x 3. Pleasant and cooperative.  Lymphatic: No lymphadenopathy. No lymphedema  Psychological: Appropriate mood and behavior  Skin: Warm and dry, no lesions, no rashes    A complete review of systems was performed and all systems were normal except what is noted in the interval history.      Assessment/Plan Patient is a 75 y/o with vaginal dysplasia, persistent after laser ablation. Vaginal atrophy.        PLAN:  Pap today, F/U results  If negative F/U in 6 months or as needed and pap annually.

## 2025-01-07 ENCOUNTER — OFFICE VISIT (OUTPATIENT)
Dept: GYNECOLOGIC ONCOLOGY | Facility: CLINIC | Age: 75
End: 2025-01-07
Payer: MEDICARE

## 2025-01-07 VITALS
WEIGHT: 201.94 LBS | BODY MASS INDEX: 30.71 KG/M2 | HEART RATE: 67 BPM | OXYGEN SATURATION: 95 % | DIASTOLIC BLOOD PRESSURE: 82 MMHG | SYSTOLIC BLOOD PRESSURE: 136 MMHG | RESPIRATION RATE: 15 BRPM | TEMPERATURE: 97.5 F

## 2025-01-07 DIAGNOSIS — N95.2 VAGINAL ATROPHY: ICD-10-CM

## 2025-01-07 DIAGNOSIS — N89.3 VAGINAL DYSPLASIA: Primary | ICD-10-CM

## 2025-01-07 PROCEDURE — 3079F DIAST BP 80-89 MM HG: CPT | Performed by: NURSE PRACTITIONER

## 2025-01-07 PROCEDURE — 99213 OFFICE O/P EST LOW 20 MIN: CPT | Performed by: NURSE PRACTITIONER

## 2025-01-07 PROCEDURE — 3075F SYST BP GE 130 - 139MM HG: CPT | Performed by: NURSE PRACTITIONER

## 2025-01-07 PROCEDURE — 1159F MED LIST DOCD IN RCRD: CPT | Performed by: NURSE PRACTITIONER

## 2025-01-07 PROCEDURE — 1126F AMNT PAIN NOTED NONE PRSNT: CPT | Performed by: NURSE PRACTITIONER

## 2025-01-07 ASSESSMENT — COLUMBIA-SUICIDE SEVERITY RATING SCALE - C-SSRS
6. HAVE YOU EVER DONE ANYTHING, STARTED TO DO ANYTHING, OR PREPARED TO DO ANYTHING TO END YOUR LIFE?: NO
1. IN THE PAST MONTH, HAVE YOU WISHED YOU WERE DEAD OR WISHED YOU COULD GO TO SLEEP AND NOT WAKE UP?: NO
2. HAVE YOU ACTUALLY HAD ANY THOUGHTS OF KILLING YOURSELF?: NO

## 2025-01-07 ASSESSMENT — ENCOUNTER SYMPTOMS
DEPRESSION: 0
OCCASIONAL FEELINGS OF UNSTEADINESS: 0
LOSS OF SENSATION IN FEET: 0

## 2025-01-07 ASSESSMENT — PAIN SCALES - GENERAL: PAINLEVEL_OUTOF10: 0-NO PAIN

## 2025-01-14 ENCOUNTER — TELEPHONE (OUTPATIENT)
Dept: GYNECOLOGIC ONCOLOGY | Facility: HOSPITAL | Age: 75
End: 2025-01-14
Payer: MEDICARE

## 2025-01-14 LAB
CYTOLOGY CMNT CVX/VAG CYTO-IMP: NORMAL
HPV HR 12 DNA GENITAL QL NAA+PROBE: POSITIVE
HPV HR GENOTYPES PNL CVX NAA+PROBE: POSITIVE
HPV16 DNA SPEC QL NAA+PROBE: POSITIVE
HPV18 DNA SPEC QL NAA+PROBE: NEGATIVE
LAB AP HPV GENOTYPE QUESTION: YES
LAB AP HPV HR: NORMAL
LABORATORY COMMENT REPORT: NORMAL
PATH REPORT.TOTAL CANCER: NORMAL

## 2025-01-14 NOTE — TELEPHONE ENCOUNTER
I spoke with the patient and told her that based on her pap results her NP would like her to come back for a colposcopy. I gave her the time, date, and location of her colposcopy. She verbalized her understanding and did not have any questions at this time.

## 2025-01-16 NOTE — PROGRESS NOTES
Patient ID: Linda Venegas is a 73 y.o. female.  Primary Care Provider: Deonna Guzman MD    Subjective    HPI: 73 y/o with vaginal dysplasia.     10/19 LSIL  6/20 LSIL, cannot exclude HSIL, HPV 16 positive, bx negative  8/20 VAIN 1  4/21 HSIL pap HPV 16 positive  10/11/21 laser ablation of vagina.    6/2022 PAP LSIL cannot rule out HSIL, HPV 16+, HPV non 16/18 +  10/23 HSIL, HPV16+.     PMH: HTN, HLD, T2DM, asthma, anxiety, arthritis  PSH: Breast biopsy, knee replacement, hysterectomy, tonsillectomy  Social history: Denies use of alcohol, tobacco and other drug use.       Objective  Visit Vitals  /80 (BP Location: Right arm, Patient Position: Sitting, BP Cuff Size: Adult)   Pulse 68   Temp 36.9 °C (98.4 °F) (Temporal)   Resp 16       Interval history:  Patient is a 73 y/o with vaginal dysplasia, persistent after laser ablation. Last pap 1/2025 was LGSIL, cannot rule out ASC-H, HPV 16+.   Here for colposcopy.  Patient admits she has been using vaginal douches a few times a week.  She also has been using Estrace three times a week.       Physical Exam:    Constitutional: Doing well.  Eyes: PERRL  ENMT: Moist mucus membranes  Head/Neck: Supple. Symmetrical  Gastrointestinal: Non-distended, soft, non-tender  Genitourinary:   Colposcopy: Adequate colposcopy performed after application of dilute acetic acid solution to the cervix.  There were no acetowhite changes,  punctate vessels noted at 3 o'clock.  No biopsy taken. The patient tolerated the procedure well.   Musculoskeletal: ROM intact, no joint swelling, normal strength  Extremities: No edema  Neurological: Alert and oriented x 3. Pleasant and cooperative.  Psychological: Appropriate mood and behavior  Skin: Warm and dry, no lesions, no rashes    A complete review of systems was performed and all systems were normal except what is noted in the interval history.      Assessment/Plan Patient is a 73 y/o with vaginal dysplasia, persistent after laser ablation.  No acetowhite changes on colposcopy, atrophic changes.       PLAN:  Continue with Estrace, stop vaginal douches  F/U in 1 year or as needed

## 2025-01-17 ENCOUNTER — TELEPHONE (OUTPATIENT)
Age: 75
End: 2025-01-17
Payer: MEDICARE

## 2025-01-17 NOTE — TELEPHONE ENCOUNTER
LM stating she is no longer taking her citalopram 20mg daily; states doing fine without it. Request Rx be canceled at pharmacy so they don't keep filling it.

## 2025-01-21 ENCOUNTER — OFFICE VISIT (OUTPATIENT)
Dept: GYNECOLOGIC ONCOLOGY | Facility: CLINIC | Age: 75
End: 2025-01-21
Payer: MEDICARE

## 2025-01-21 VITALS
DIASTOLIC BLOOD PRESSURE: 80 MMHG | SYSTOLIC BLOOD PRESSURE: 150 MMHG | RESPIRATION RATE: 16 BRPM | WEIGHT: 203.93 LBS | OXYGEN SATURATION: 95 % | TEMPERATURE: 98.4 F | HEART RATE: 68 BPM | BODY MASS INDEX: 31.01 KG/M2

## 2025-01-21 DIAGNOSIS — N95.2 VAGINAL ATROPHY: ICD-10-CM

## 2025-01-21 DIAGNOSIS — N89.3 VAGINAL DYSPLASIA: ICD-10-CM

## 2025-01-21 PROCEDURE — 1036F TOBACCO NON-USER: CPT | Performed by: NURSE PRACTITIONER

## 2025-01-21 PROCEDURE — 1126F AMNT PAIN NOTED NONE PRSNT: CPT | Performed by: NURSE PRACTITIONER

## 2025-01-21 PROCEDURE — 1159F MED LIST DOCD IN RCRD: CPT | Performed by: NURSE PRACTITIONER

## 2025-01-21 PROCEDURE — 3077F SYST BP >= 140 MM HG: CPT | Performed by: NURSE PRACTITIONER

## 2025-01-21 PROCEDURE — 57420 EXAM OF VAGINA W/SCOPE: CPT | Performed by: NURSE PRACTITIONER

## 2025-01-21 PROCEDURE — 3079F DIAST BP 80-89 MM HG: CPT | Performed by: NURSE PRACTITIONER

## 2025-01-21 RX ORDER — ESTRADIOL 0.1 MG/G
CREAM VAGINAL
Qty: 42.5 G | Refills: 3 | Status: SHIPPED | OUTPATIENT
Start: 2025-01-21

## 2025-01-21 ASSESSMENT — PAIN SCALES - GENERAL: PAINLEVEL_OUTOF10: 0-NO PAIN

## 2025-02-07 ENCOUNTER — TELEPHONE (OUTPATIENT)
Dept: GYNECOLOGIC ONCOLOGY | Facility: HOSPITAL | Age: 75
End: 2025-02-07
Payer: MEDICARE

## 2025-02-07 NOTE — TELEPHONE ENCOUNTER
Patient called requesting results from 1/21/25 office visit with Florecita Adames CNP   Phoned patient to notify that Florecita performed a colposcopy at the  time of 1/21 office visit and exam was negative and no biopsy was taken.    Message left on patient voice mail with appointment update/information.

## 2025-04-03 ENCOUNTER — APPOINTMENT (OUTPATIENT)
Age: 75
End: 2025-04-03
Payer: MEDICARE

## 2025-04-04 ENCOUNTER — APPOINTMENT (OUTPATIENT)
Age: 75
End: 2025-04-04
Payer: MEDICARE

## 2025-04-04 VITALS
HEIGHT: 68 IN | DIASTOLIC BLOOD PRESSURE: 80 MMHG | SYSTOLIC BLOOD PRESSURE: 138 MMHG | BODY MASS INDEX: 31.22 KG/M2 | HEART RATE: 64 BPM | WEIGHT: 206 LBS

## 2025-04-04 DIAGNOSIS — F41.9 ANXIETY: ICD-10-CM

## 2025-04-04 DIAGNOSIS — I10 BENIGN HYPERTENSION: ICD-10-CM

## 2025-04-04 DIAGNOSIS — E78.49 OTHER HYPERLIPIDEMIA: ICD-10-CM

## 2025-04-04 DIAGNOSIS — Z00.00 ROUTINE GENERAL MEDICAL EXAMINATION AT HEALTH CARE FACILITY: Primary | ICD-10-CM

## 2025-04-04 DIAGNOSIS — E11.65 TYPE 2 DIABETES MELLITUS WITH HYPERGLYCEMIA, WITHOUT LONG-TERM CURRENT USE OF INSULIN: ICD-10-CM

## 2025-04-04 PROCEDURE — G0439 PPPS, SUBSEQ VISIT: HCPCS | Performed by: FAMILY MEDICINE

## 2025-04-04 PROCEDURE — 1159F MED LIST DOCD IN RCRD: CPT | Performed by: FAMILY MEDICINE

## 2025-04-04 PROCEDURE — 3075F SYST BP GE 130 - 139MM HG: CPT | Performed by: FAMILY MEDICINE

## 2025-04-04 PROCEDURE — 1124F ACP DISCUSS-NO DSCNMKR DOCD: CPT | Performed by: FAMILY MEDICINE

## 2025-04-04 PROCEDURE — G2211 COMPLEX E/M VISIT ADD ON: HCPCS | Performed by: FAMILY MEDICINE

## 2025-04-04 PROCEDURE — 1036F TOBACCO NON-USER: CPT | Performed by: FAMILY MEDICINE

## 2025-04-04 PROCEDURE — 1170F FXNL STATUS ASSESSED: CPT | Performed by: FAMILY MEDICINE

## 2025-04-04 PROCEDURE — 99214 OFFICE O/P EST MOD 30 MIN: CPT | Performed by: FAMILY MEDICINE

## 2025-04-04 PROCEDURE — 3079F DIAST BP 80-89 MM HG: CPT | Performed by: FAMILY MEDICINE

## 2025-04-04 PROCEDURE — 1160F RVW MEDS BY RX/DR IN RCRD: CPT | Performed by: FAMILY MEDICINE

## 2025-04-04 ASSESSMENT — ACTIVITIES OF DAILY LIVING (ADL)
TAKING_MEDICATION: INDEPENDENT
GROCERY_SHOPPING: INDEPENDENT
BATHING: INDEPENDENT
MANAGING_FINANCES: INDEPENDENT
DOING_HOUSEWORK: INDEPENDENT
DRESSING: INDEPENDENT

## 2025-04-04 NOTE — PROGRESS NOTES
"Subjective   Reason for Visit: Aayush Venegas is an 74 y.o. female here for a Medicare Wellness visit.     Past Medical, Surgical, and Family History reviewed and updated in chart.    Reviewed all medications by prescribing practitioner or clinical pharmacist (such as prescriptions, OTCs, herbal therapies and supplements) and documented in the medical record.    HPI  Dm, labs pending, interested in newer meds that also have weight loss indication, will get labs, discussed pros and cons  Anxiety, went off citalopram, doing fine  Htn, hyperlipidemia stable  Here with  Frankie.  No other problems or concerns.   Patient Care Team:  Deonna Guzman MD as PCP - General (Family Medicine)     Review of Systems   All other systems reviewed and are negative.      Objective   Vitals:  /80 (BP Location: Left arm, Patient Position: Sitting)   Pulse 64   Ht 1.727 m (5' 8\")   Wt 93.4 kg (206 lb)   BMI 31.32 kg/m²       Physical Exam  Vitals and nursing note reviewed.   Constitutional:       General: She is not in acute distress.     Appearance: Normal appearance. She is not toxic-appearing.   HENT:      Head: Normocephalic and atraumatic.   Cardiovascular:      Rate and Rhythm: Normal rate and regular rhythm.      Heart sounds: No murmur heard.  Pulmonary:      Effort: Pulmonary effort is normal.      Breath sounds: Normal breath sounds.   Musculoskeletal:      Cervical back: Neck supple. No rigidity.      Comments:     Skin:     General: Skin is warm and dry.   Neurological:      General: No focal deficit present.      Mental Status: She is alert and oriented to person, place, and time.   Psychiatric:         Mood and Affect: Mood normal.         Behavior: Behavior normal.           Assessment & Plan  Benign hypertension         Type 2 diabetes mellitus with hyperglycemia, without long-term current use of insulin    Orders:    CBC and Auto Differential; Future    Comprehensive Metabolic Panel; Future    " Hemoglobin A1C; Future    Lipid Panel; Future    TSH with reflex to Free T4 if abnormal; Future    Microscopic Only, Urine; Future    Vitamin B12; Future    Albumin-Creatinine Ratio, Urine Random; Future    Anxiety         Other hyperlipidemia         Routine general medical examination at health care facility          Assuming labs okay, follow up six months, labs prior, call concerns.

## 2025-04-04 NOTE — ASSESSMENT & PLAN NOTE
Orders:    CBC and Auto Differential; Future    Comprehensive Metabolic Panel; Future    Hemoglobin A1C; Future    Lipid Panel; Future    TSH with reflex to Free T4 if abnormal; Future    Microscopic Only, Urine; Future    Vitamin B12; Future    Albumin-Creatinine Ratio, Urine Random; Future

## 2025-04-05 LAB
ALBUMIN SERPL-MCNC: 4.7 G/DL (ref 3.6–5.1)
ALP SERPL-CCNC: 92 U/L (ref 37–153)
ALT SERPL-CCNC: 25 U/L (ref 6–29)
ANION GAP SERPL CALCULATED.4IONS-SCNC: 10 MMOL/L (CALC) (ref 7–17)
AST SERPL-CCNC: 27 U/L (ref 10–35)
BILIRUB SERPL-MCNC: 0.7 MG/DL (ref 0.2–1.2)
BUN SERPL-MCNC: 15 MG/DL (ref 7–25)
CALCIUM SERPL-MCNC: 9.3 MG/DL (ref 8.6–10.4)
CHLORIDE SERPL-SCNC: 101 MMOL/L (ref 98–110)
CO2 SERPL-SCNC: 30 MMOL/L (ref 20–32)
CREAT SERPL-MCNC: 0.61 MG/DL (ref 0.6–1)
EGFRCR SERPLBLD CKD-EPI 2021: 93 ML/MIN/1.73M2
EST. AVERAGE GLUCOSE BLD GHB EST-MCNC: 163 MG/DL
EST. AVERAGE GLUCOSE BLD GHB EST-SCNC: 9 MMOL/L
GLUCOSE SERPL-MCNC: 113 MG/DL (ref 65–99)
HBA1C MFR BLD: 7.3 % OF TOTAL HGB
POTASSIUM SERPL-SCNC: 4.3 MMOL/L (ref 3.5–5.3)
PROT SERPL-MCNC: 6.8 G/DL (ref 6.1–8.1)
SODIUM SERPL-SCNC: 141 MMOL/L (ref 135–146)

## 2025-04-08 DIAGNOSIS — E11.65 TYPE 2 DIABETES MELLITUS WITH HYPERGLYCEMIA, WITHOUT LONG-TERM CURRENT USE OF INSULIN: ICD-10-CM

## 2025-04-08 RX ORDER — METFORMIN HYDROCHLORIDE 500 MG/1
TABLET ORAL
Qty: 180 TABLET | Refills: 1 | Status: SHIPPED | OUTPATIENT
Start: 2025-04-08

## 2025-05-20 DIAGNOSIS — E78.49 OTHER HYPERLIPIDEMIA: ICD-10-CM

## 2025-05-20 DIAGNOSIS — I10 BENIGN HYPERTENSION: ICD-10-CM

## 2025-05-20 RX ORDER — ATORVASTATIN CALCIUM 10 MG/1
10 TABLET, FILM COATED ORAL DAILY
Qty: 90 TABLET | Refills: 1 | Status: SHIPPED | OUTPATIENT
Start: 2025-05-20

## 2025-05-20 RX ORDER — BISOPROLOL FUMARATE AND HYDROCHLOROTHIAZIDE 5; 6.25 MG/1; MG/1
1 TABLET ORAL DAILY
Qty: 90 TABLET | Refills: 1 | Status: SHIPPED | OUTPATIENT
Start: 2025-05-20

## 2025-05-21 ENCOUNTER — APPOINTMENT (OUTPATIENT)
Age: 75
End: 2025-05-21
Payer: MEDICARE

## 2025-05-21 ENCOUNTER — HOSPITAL ENCOUNTER (OUTPATIENT)
Dept: CARDIOLOGY | Facility: HOSPITAL | Age: 75
Discharge: HOME | End: 2025-05-21
Payer: MEDICARE

## 2025-05-21 VITALS
BODY MASS INDEX: 30.62 KG/M2 | WEIGHT: 202 LBS | HEART RATE: 64 BPM | HEIGHT: 68 IN | SYSTOLIC BLOOD PRESSURE: 144 MMHG | DIASTOLIC BLOOD PRESSURE: 84 MMHG

## 2025-05-21 DIAGNOSIS — H25.89 OTHER AGE-RELATED CATARACT OF BOTH EYES: Primary | ICD-10-CM

## 2025-05-21 DIAGNOSIS — E11.65 TYPE 2 DIABETES MELLITUS WITH HYPERGLYCEMIA, WITHOUT LONG-TERM CURRENT USE OF INSULIN: ICD-10-CM

## 2025-05-21 DIAGNOSIS — E11.9 TYPE 2 DIABETES MELLITUS WITHOUT RETINOPATHY (MULTI): ICD-10-CM

## 2025-05-21 DIAGNOSIS — H25.89 OTHER AGE-RELATED CATARACT OF BOTH EYES: ICD-10-CM

## 2025-05-21 LAB
ATRIAL RATE: 59 BPM
P AXIS: 74 DEGREES
P OFFSET: 193 MS
P ONSET: 138 MS
PR INTERVAL: 162 MS
Q ONSET: 219 MS
QRS COUNT: 10 BEATS
QRS DURATION: 88 MS
QT INTERVAL: 422 MS
QTC CALCULATION(BAZETT): 417 MS
QTC FREDERICIA: 419 MS
R AXIS: 58 DEGREES
T AXIS: 67 DEGREES
T OFFSET: 430 MS
VENTRICULAR RATE: 59 BPM

## 2025-05-21 PROCEDURE — 1036F TOBACCO NON-USER: CPT | Performed by: FAMILY MEDICINE

## 2025-05-21 PROCEDURE — G2211 COMPLEX E/M VISIT ADD ON: HCPCS | Performed by: FAMILY MEDICINE

## 2025-05-21 PROCEDURE — 3077F SYST BP >= 140 MM HG: CPT | Performed by: FAMILY MEDICINE

## 2025-05-21 PROCEDURE — 93005 ELECTROCARDIOGRAM TRACING: CPT

## 2025-05-21 PROCEDURE — 1159F MED LIST DOCD IN RCRD: CPT | Performed by: FAMILY MEDICINE

## 2025-05-21 PROCEDURE — 3079F DIAST BP 80-89 MM HG: CPT | Performed by: FAMILY MEDICINE

## 2025-05-21 PROCEDURE — 99214 OFFICE O/P EST MOD 30 MIN: CPT | Performed by: FAMILY MEDICINE

## 2025-05-21 PROCEDURE — 93010 ELECTROCARDIOGRAM REPORT: CPT | Performed by: STUDENT IN AN ORGANIZED HEALTH CARE EDUCATION/TRAINING PROGRAM

## 2025-05-21 PROCEDURE — 1160F RVW MEDS BY RX/DR IN RCRD: CPT | Performed by: FAMILY MEDICINE

## 2025-05-21 RX ORDER — ERYTHROMYCIN 5 MG/G
1 OINTMENT OPHTHALMIC
COMMUNITY
Start: 2025-04-16

## 2025-05-21 RX ORDER — FLUOROMETHOLONE 1 MG/ML
SUSPENSION/ DROPS OPHTHALMIC
COMMUNITY

## 2025-05-21 RX ORDER — METFORMIN HYDROCHLORIDE 500 MG/1
500 TABLET ORAL 3 TIMES DAILY
Qty: 90 TABLET | Refills: 3 | Status: SHIPPED | OUTPATIENT
Start: 2025-05-21

## 2025-05-21 NOTE — PROGRESS NOTES
"     Subjective  Trudence D Frames \"Linda\" is a 74 y.o. female who presents for Pre-op Exam.  HPI  Scheduled for cataract surgery with DR Richardson, he requires an EKG and bmp.   She has no personal or family history of severe or unusual reaction to anesthesia.  She can walk up two flights of stairs without having to stop and rest.    DM< she is still on 500 bid metformin, opted not to increase to 1000 bid for elevated A1c.  She is getting exercise on her bike and trying to eat better.  Wonders about trying to  take the metformin 500 tid instead.  HTN, hyperlipidemia, have been stable.  Review of Systems   All other systems reviewed and are negative.  .    Allergies[1]    Medications Ordered Prior to Encounter[2]    Problem List[3]    Objective     Visit Vitals  /84 (BP Location: Left arm, Patient Position: Sitting)   Pulse 64      Physical Exam  Vitals and nursing note reviewed.   Constitutional:       General: She is not in acute distress.     Appearance: Normal appearance. She is not toxic-appearing.   HENT:      Head: Normocephalic and atraumatic.   Cardiovascular:      Rate and Rhythm: Normal rate and regular rhythm.      Heart sounds: No murmur heard.  Pulmonary:      Effort: Pulmonary effort is normal.      Breath sounds: Normal breath sounds.   Musculoskeletal:      Cervical back: Neck supple. No rigidity.      Comments:     Skin:     General: Skin is warm and dry.   Neurological:      General: No focal deficit present.      Mental Status: She is alert and oriented to person, place, and time.   Psychiatric:         Mood and Affect: Mood normal.         Behavior: Behavior normal.         Assessment/Plan   Problem List Items Addressed This Visit       Diabetes mellitus with hyperglycemia, without long-term current use of insulin    Relevant Medications    metFORMIN (Glucophage) 500 mg tablet     Other Visit Diagnoses         Other age-related cataract of both eyes    -  Primary    Relevant Orders    ECG 12 " lead (Ancillary Performed)    Basic metabolic panel    Basic metabolic panel    ECG 12 lead (Ancillary Performed)      Type 2 diabetes mellitus without retinopathy (Multi)              Follow up as scheduled, routine labs prior.  Call concerns.      Pt is medically stable and acceptable risk for proposed procedures.    Deonna Guzman MD          [1]   Allergies  Allergen Reactions    Diphenhydramine Hcl Unknown    Nyquil Liquicaps Unknown    Penicillins Unknown    Sulfa (Sulfonamide Antibiotics) Unknown     tingling all over    Sulfamethoxazole Unknown    Tree Nuts Other     walnuts    [place] Unknown   [2]   Current Outpatient Medications on File Prior to Visit   Medication Sig Dispense Refill    albuterol (Ventolin HFA) 90 mcg/actuation inhaler Inhale 2 puffs every 4 hours if needed for wheezing or shortness of breath. 8 g 5    atorvastatin (Lipitor) 10 mg tablet Take 1 tablet by mouth once daily 90 tablet 1    bisoproloL-hydrochlorothiazide (Ziac) 5-6.25 mg tablet Take 1 tablet by mouth once daily 90 tablet 1    CYANOCOBALAMIN, VITAMIN B-12, ORAL Take by mouth. TABS      erythromycin (Romycin) 5 mg/gram (0.5 %) ophthalmic ointment Apply 1 Application to affected eye(s).      estradiol (Estrace) 0.01 % (0.1 mg/gram) vaginal cream Dime to nickel size amount on your finger and apply to directed area.  3 nights a week at bedtime 42.5 g 3    fluorometholone (FML) 0.1 % ophthalmic suspension INSTILL 1 DROP INTO EACH EYE THREE TIMES DAILY      fluticasone (Flonase) 50 mcg/actuation nasal spray Administer into affected nostril(s). LR      FreeStyle Lite Strips strip USE 1 STRIP TO CHECK GLUCOSE ONCE DAILY 100 each 0    lancets misc Check fasting blood sugar once daily      multivitamin tablet Take by mouth.      nystatin (Mycostatin) cream APPLY  CREAM  TO AFFECTED AREAS  THREE TIMES DAILY FOR 7 DAYS 30 g 0    pantoprazole (ProtoNix) 40 mg EC tablet Take 1 tablet (40 mg) by mouth once daily. 90 tablet 3    vitamin E  acetate (VITAMIN E ORAL) Take by mouth. Vitamin E CAPS      [DISCONTINUED] metFORMIN (Glucophage) 500 mg tablet TAKE 1 TABLET BY MOUTH TWICE DAILY AFTER A MEAL 180 tablet 1    [DISCONTINUED] atorvastatin (Lipitor) 10 mg tablet Take 1 tablet by mouth once daily 90 tablet 1    [DISCONTINUED] bisoproloL-hydrochlorothiazide (Ziac) 5-6.25 mg tablet Take 1 tablet by mouth once daily 90 tablet 1    [DISCONTINUED] cholecalciferol (Vitamin D-3) 50 MCG (2000 UT) tablet Take by mouth. (Patient not taking: Reported on 5/21/2025)       No current facility-administered medications on file prior to visit.   [3]   Patient Active Problem List  Diagnosis    Anxiety    Asthma, mild ([facility])    Benign hypertension    Diabetes mellitus with hyperglycemia, without long-term current use of insulin    Herpes simplex type 2 infection    Hyperlipidemia    Insomnia, controlled    Seasonal allergies    Vaginal dysplasia    Gastroesophageal reflux disease without esophagitis

## 2025-05-22 LAB
ANION GAP SERPL CALCULATED.4IONS-SCNC: 12 MMOL/L (CALC) (ref 7–17)
BUN SERPL-MCNC: 15 MG/DL (ref 7–25)
BUN/CREAT SERPL: ABNORMAL (CALC) (ref 6–22)
CALCIUM SERPL-MCNC: 9.3 MG/DL (ref 8.6–10.4)
CHLORIDE SERPL-SCNC: 101 MMOL/L (ref 98–110)
CO2 SERPL-SCNC: 27 MMOL/L (ref 20–32)
CREAT SERPL-MCNC: 0.71 MG/DL (ref 0.6–1)
EGFRCR SERPLBLD CKD-EPI 2021: 88 ML/MIN/1.73M2
GLUCOSE SERPL-MCNC: 138 MG/DL (ref 65–99)
POTASSIUM SERPL-SCNC: 4.1 MMOL/L (ref 3.5–5.3)
SODIUM SERPL-SCNC: 140 MMOL/L (ref 135–146)

## 2025-06-05 ENCOUNTER — PREP FOR PROCEDURE (OUTPATIENT)
Dept: OPERATING ROOM | Facility: HOSPITAL | Age: 75
End: 2025-06-05
Payer: MEDICARE

## 2025-06-05 DIAGNOSIS — H25.811 COMBINED FORMS OF AGE-RELATED CATARACT OF RIGHT EYE: Primary | ICD-10-CM

## 2025-06-05 RX ORDER — KETOROLAC TROMETHAMINE 5 MG/ML
1 SOLUTION OPHTHALMIC
OUTPATIENT
Start: 2025-06-05 | End: 2025-06-05

## 2025-06-05 RX ORDER — POVIDONE-IODINE 5 %
SOLUTION, NON-ORAL OPHTHALMIC (EYE) ONCE
OUTPATIENT
Start: 2025-06-05 | End: 2025-06-05

## 2025-06-05 RX ORDER — TETRACAINE HYDROCHLORIDE 5 MG/ML
1 SOLUTION OPHTHALMIC ONCE
OUTPATIENT
Start: 2025-06-05 | End: 2025-06-05

## 2025-06-05 RX ORDER — PREDNISOLONE ACETATE 10 MG/ML
1 SUSPENSION/ DROPS OPHTHALMIC ONCE
OUTPATIENT
Start: 2025-06-05 | End: 2025-06-05

## 2025-06-19 ENCOUNTER — ANESTHESIA EVENT (OUTPATIENT)
Dept: OPERATING ROOM | Facility: HOSPITAL | Age: 75
End: 2025-06-19
Payer: MEDICARE

## 2025-06-19 ENCOUNTER — HOSPITAL ENCOUNTER (OUTPATIENT)
Facility: HOSPITAL | Age: 75
Setting detail: OUTPATIENT SURGERY
Discharge: HOME | End: 2025-06-19
Attending: OPHTHALMOLOGY | Admitting: OPHTHALMOLOGY
Payer: MEDICARE

## 2025-06-19 ENCOUNTER — ANESTHESIA (OUTPATIENT)
Dept: OPERATING ROOM | Facility: HOSPITAL | Age: 75
End: 2025-06-19
Payer: MEDICARE

## 2025-06-19 VITALS
DIASTOLIC BLOOD PRESSURE: 90 MMHG | HEIGHT: 68 IN | HEART RATE: 58 BPM | BODY MASS INDEX: 30.47 KG/M2 | WEIGHT: 201.06 LBS | TEMPERATURE: 96.9 F | RESPIRATION RATE: 16 BRPM | OXYGEN SATURATION: 96 % | SYSTOLIC BLOOD PRESSURE: 174 MMHG

## 2025-06-19 LAB — GLUCOSE BLD MANUAL STRIP-MCNC: 149 MG/DL (ref 74–99)

## 2025-06-19 PROCEDURE — 2500000004 HC RX 250 GENERAL PHARMACY W/ HCPCS (ALT 636 FOR OP/ED): Performed by: ANESTHESIOLOGY

## 2025-06-19 PROCEDURE — 2500000005 HC RX 250 GENERAL PHARMACY W/O HCPCS: Performed by: OPHTHALMOLOGY

## 2025-06-19 PROCEDURE — 2500000004 HC RX 250 GENERAL PHARMACY W/ HCPCS (ALT 636 FOR OP/ED): Performed by: OPHTHALMOLOGY

## 2025-06-19 PROCEDURE — 3600000003 HC OR TIME - INITIAL BASE CHARGE - PROCEDURE LEVEL THREE: Performed by: OPHTHALMOLOGY

## 2025-06-19 PROCEDURE — 3700000001 HC GENERAL ANESTHESIA TIME - INITIAL BASE CHARGE: Performed by: OPHTHALMOLOGY

## 2025-06-19 PROCEDURE — 2500000001 HC RX 250 WO HCPCS SELF ADMINISTERED DRUGS (ALT 637 FOR MEDICARE OP): Performed by: OPHTHALMOLOGY

## 2025-06-19 PROCEDURE — 7100000010 HC PHASE TWO TIME - EACH INCREMENTAL 1 MINUTE: Performed by: OPHTHALMOLOGY

## 2025-06-19 PROCEDURE — 2760000001 HC OR 276 NO HCPCS: Performed by: OPHTHALMOLOGY

## 2025-06-19 PROCEDURE — 3600000008 HC OR TIME - EACH INCREMENTAL 1 MINUTE - PROCEDURE LEVEL THREE: Performed by: OPHTHALMOLOGY

## 2025-06-19 PROCEDURE — 2720000007 HC OR 272 NO HCPCS: Performed by: OPHTHALMOLOGY

## 2025-06-19 PROCEDURE — 82947 ASSAY GLUCOSE BLOOD QUANT: CPT

## 2025-06-19 PROCEDURE — 7100000009 HC PHASE TWO TIME - INITIAL BASE CHARGE: Performed by: OPHTHALMOLOGY

## 2025-06-19 PROCEDURE — V2632 POST CHMBR INTRAOCULAR LENS: HCPCS | Performed by: OPHTHALMOLOGY

## 2025-06-19 PROCEDURE — 3700000002 HC GENERAL ANESTHESIA TIME - EACH INCREMENTAL 1 MINUTE: Performed by: OPHTHALMOLOGY

## 2025-06-19 DEVICE — IMPLANTABLE DEVICE: Type: IMPLANTABLE DEVICE | Site: LENS | Status: FUNCTIONAL

## 2025-06-19 RX ORDER — TETRACAINE HYDROCHLORIDE 5 MG/ML
1 SOLUTION OPHTHALMIC ONCE
Status: COMPLETED | OUTPATIENT
Start: 2025-06-19 | End: 2025-06-19

## 2025-06-19 RX ORDER — PREDNISOLONE ACETATE 10 MG/ML
1 SUSPENSION/ DROPS OPHTHALMIC ONCE
Status: COMPLETED | OUTPATIENT
Start: 2025-06-19 | End: 2025-06-19

## 2025-06-19 RX ORDER — SODIUM CHLORIDE, SODIUM LACTATE, POTASSIUM CHLORIDE, CALCIUM CHLORIDE 600; 310; 30; 20 MG/100ML; MG/100ML; MG/100ML; MG/100ML
100 INJECTION, SOLUTION INTRAVENOUS CONTINUOUS
Status: DISCONTINUED | OUTPATIENT
Start: 2025-06-19 | End: 2025-06-19 | Stop reason: HOSPADM

## 2025-06-19 RX ORDER — MIDAZOLAM HYDROCHLORIDE 1 MG/ML
INJECTION INTRAMUSCULAR; INTRAVENOUS AS NEEDED
Status: DISCONTINUED | OUTPATIENT
Start: 2025-06-19 | End: 2025-06-19

## 2025-06-19 RX ORDER — SODIUM CHLORIDE, SODIUM LACTATE, POTASSIUM CHLORIDE, CALCIUM CHLORIDE 600; 310; 30; 20 MG/100ML; MG/100ML; MG/100ML; MG/100ML
INJECTION, SOLUTION INTRAVENOUS CONTINUOUS PRN
Status: DISCONTINUED | OUTPATIENT
Start: 2025-06-19 | End: 2025-06-19

## 2025-06-19 RX ORDER — POVIDONE-IODINE 5 %
SOLUTION, NON-ORAL OPHTHALMIC (EYE) ONCE
Status: COMPLETED | OUTPATIENT
Start: 2025-06-19 | End: 2025-06-19

## 2025-06-19 RX ORDER — KETOROLAC TROMETHAMINE 5 MG/ML
1 SOLUTION OPHTHALMIC
Status: COMPLETED | OUTPATIENT
Start: 2025-06-19 | End: 2025-06-19

## 2025-06-19 RX ADMIN — KETOROLAC TROMETHAMINE 1 DROP: 5 SOLUTION/ DROPS OPHTHALMIC at 07:43

## 2025-06-19 RX ADMIN — POVIDONE-IODINE: 5 SOLUTION OPHTHALMIC at 07:37

## 2025-06-19 RX ADMIN — MIDAZOLAM HYDROCHLORIDE 2 MG: 1 INJECTION, SOLUTION INTRAMUSCULAR; INTRAVENOUS at 08:33

## 2025-06-19 RX ADMIN — PHENYLEPHRINE HYDROCHLORIDE 1 DROP: 100 SOLUTION/ DROPS OPHTHALMIC at 08:00

## 2025-06-19 RX ADMIN — KETOROLAC TROMETHAMINE 1 DROP: 5 SOLUTION/ DROPS OPHTHALMIC at 07:37

## 2025-06-19 RX ADMIN — POLYVINYL ALCOHOL, POVIDONE 1 DROP: 14; 6 SOLUTION/ DROPS OPHTHALMIC at 07:43

## 2025-06-19 RX ADMIN — PHENYLEPHRINE HYDROCHLORIDE 1 DROP: 100 SOLUTION/ DROPS OPHTHALMIC at 07:36

## 2025-06-19 RX ADMIN — PHENYLEPHRINE HYDROCHLORIDE 1 DROP: 100 SOLUTION/ DROPS OPHTHALMIC at 07:52

## 2025-06-19 RX ADMIN — KETOROLAC TROMETHAMINE 1 DROP: 5 SOLUTION/ DROPS OPHTHALMIC at 07:52

## 2025-06-19 RX ADMIN — POLYVINYL ALCOHOL, POVIDONE 1 DROP: 14; 6 SOLUTION/ DROPS OPHTHALMIC at 07:52

## 2025-06-19 RX ADMIN — PHENYLEPHRINE HYDROCHLORIDE 1 DROP: 100 SOLUTION/ DROPS OPHTHALMIC at 07:42

## 2025-06-19 RX ADMIN — KETOROLAC TROMETHAMINE 1 DROP: 5 SOLUTION/ DROPS OPHTHALMIC at 08:00

## 2025-06-19 RX ADMIN — POLYVINYL ALCOHOL, POVIDONE 1 DROP: 14; 6 SOLUTION/ DROPS OPHTHALMIC at 07:37

## 2025-06-19 RX ADMIN — TETRACAINE HYDROCHLORIDE 1 DROP: 5 SOLUTION OPHTHALMIC at 07:36

## 2025-06-19 RX ADMIN — SODIUM CHLORIDE, POTASSIUM CHLORIDE, SODIUM LACTATE AND CALCIUM CHLORIDE: 600; 310; 30; 20 INJECTION, SOLUTION INTRAVENOUS at 08:31

## 2025-06-19 RX ADMIN — POLYVINYL ALCOHOL, POVIDONE 1 DROP: 14; 6 SOLUTION/ DROPS OPHTHALMIC at 08:00

## 2025-06-19 SDOH — HEALTH STABILITY: MENTAL HEALTH: CURRENT SMOKER: 0

## 2025-06-19 ASSESSMENT — PAIN - FUNCTIONAL ASSESSMENT
PAIN_FUNCTIONAL_ASSESSMENT: 0-10

## 2025-06-19 ASSESSMENT — PAIN SCALES - GENERAL
PAINLEVEL_OUTOF10: 0 - NO PAIN
PAIN_LEVEL: 3
PAINLEVEL_OUTOF10: 0 - NO PAIN
PAINLEVEL_OUTOF10: 0 - NO PAIN

## 2025-06-19 ASSESSMENT — COLUMBIA-SUICIDE SEVERITY RATING SCALE - C-SSRS
2. HAVE YOU ACTUALLY HAD ANY THOUGHTS OF KILLING YOURSELF?: NO
6. HAVE YOU EVER DONE ANYTHING, STARTED TO DO ANYTHING, OR PREPARED TO DO ANYTHING TO END YOUR LIFE?: NO
1. IN THE PAST MONTH, HAVE YOU WISHED YOU WERE DEAD OR WISHED YOU COULD GO TO SLEEP AND NOT WAKE UP?: NO

## 2025-06-19 NOTE — H&P
H&P Notes  - documented in this encounter   Armaan Richardson MD - 06/02/2025 9:12 AM EDT  Formatting of this note is different from the original.  HISTORY AND PHYSICAL EXAMINATION    SERVICE DATE: 6/2/2025  SERVICE TIME:    PRIMARY CARE PHYSICIAN: Deonna Guzman MD    REASON FOR VISIT:  Aayush CRENSHAW is a 74 year old female who is being seen for combined age related cataract right eye    The patient has the following:  ACTIVE PROBLEM LIST  Combined Forms of Age-Related Cataract of Right Eye  Combined Forms of Age-Related Cataract of Left Eye  Meibomian Gland Dysfunction (Mgd) of Upper and Lower Lids of Both Eyes  Punctate Keratitis of Both Eyes  Hx of Lasik  Type 2 Diabetes Mellitus Without Retinopathy (Hcc)    SUBJECTIVE  CHIEF COMPLAINT: combined age related cataract right eye  HPI: Blurry vision at all ranges of distance, glare    PAST MEDICAL HISTORY  Diagnosis Date  Diabetes mellitus (HCC)  Essential hypertension  Generalized anxiety disorder  Hypercholesteremia    PAST SURGICAL HISTORY  Procedure Laterality Date  EXT HYSTERECTOMY,W/PARTIAL VAGINECTO  KERATOMILEUSIS Bilateral  [place]  TONSILLECTOMY HX  TOTAL KNEE REPLACEMENT Bilateral    FAMILY HISTORY  Problem Relation Age of Onset  No Ocular Disease No Family History    SOCIAL HISTORY:  Social History    Tobacco Use  Smoking status: Never    MEDICATIONS:  Prior to Admission medications as of 6/2/25 0913  Medication Sig Last Dose Taking  atorvastatin (LIPITOR) 10 mg tablet Take 10 mg by mouth once daily. Yes  estradiol (ESTRACE) 0.01 % (0.1 mg/gram) vaginal cream Use vaginally. Yes  metFORMIN (GLUCOPHAGE) 500 mg tablet Take 500 mg by mouth three times a day with meals. Yes  fluticasone (FLONASE) 50 mcg/actuation nasal spray Use 1 spray in the nose once daily. Yes  pantoprazole DR (PROTONIX) 40 mg tablet Take 40 mg by mouth. Yes  fluorometholone (FML LIQUID FILM) 0.1 % ophthalmic suspension Use 1 drop in both eyes three times a day. Yes  erythromycin  (ROMYCIN) 5 mg/gram (0.5 %) ophthalmic ointment Use 1 application in both eyes daily at bedtime. Yes  Lovastatin 40 mg tablet Take 40 mg by mouth daily at bedtime. Yes  BISOPROLOL FUMARATE/HCTZ (BISOPROLOL-HYDROCHLOROTHIAZIDE ORAL) Take by mouth. Yes  citalopram 20 mg tablet Take 20 mg by mouth once daily. Yes  valACYclovir 500 mg tablet Take 500 mg by mouth twice daily. Yes  ibuprofen 800 mg tablet Take 800 mg by mouth every 8 hours as needed. Yes  HYDROCODONE BIT/ACETAMINOPHEN (HYDROCODONE-ACETAMINOPHEN ORAL) Take by mouth. Takes 500mg every 6 hours as needed Yes  CALCIUM CARBONATE (CALCIUM 500 ORAL) Take by mouth. Yes  multivitamin tablet Take 1 tablet by mouth once daily. Yes  ZOLPIDEM TARTRATE (AMBIEN ORAL) Take by mouth. Takes 1 at bedtime Yes    No medication comments found.    CURRENT ALLERGIES:  ALLERGIES  Allergen Reactions  Penicillins Unknown  Sulfa (Sulfonamide * Intolerance    REVIEW OF SYSTEMS:  PAIN ASSESSMENT:  General: No weight loss, malaise or fevers.  Neuro: No Hx of stroke or seizures  Respiratory: No history of current cough or dyspnea, or pneumonia in the past 6 weeks. No history of respiratory/pulmonary symptoms or problems  Cardiovascular: Positive for: Hypertension  GI: No history of GI symptoms or problems. No history of esophageal varices, recent ascites, or ETOH greater than 2 drinks per day.  : No history of UTI in past 6 weeks. No history of renal failure. Not currently on or requiring dialysis. No history of  symptoms or problems.  GYN: Negative for abnormal vaginal bleeding, abnormal vaginal discharge.  Pregnancy: Denies  Endocrine: Diabetes Mellitus on oral agent  Hematology: No history of bleeding or clotting disorder. Pt is not taking anti-coagulation or platelet medications. No history of hematological symptoms or problems.  Oncology: No history of CA metastasis, chemo within 30 days, or radiotherapy within 90 days. Has not lost 10% of body wt in 6 months. No history of  oncological symptoms or problems.  Psych: Anxiety  Musculoskeletal: Negative for joint pain or swelling, back pain or muscle pain.  Skin: Negative for lesions, rash and itching.    PHYSICAL EXAM:  VITALS:  /88  Pulse 72        General: Alert and oriented  Skin: Normal color, no rash, no lesions.  HEENT: EOM, pupils equal, round and reactive.  Cardiovascular: Normal S1 & S2, no rubs, murmurs or gallops. No JVD. Pulse regular.  Lungs: Normal breath sounds, no wheezes or crackles.  Abdomen: Soft, non-tender, no rigidity.  Extremities: No deformity, no edema or tenderness, no joint swelling or clubbing.  Neurological: Normal cognition and motor skills.  Pulses: Carotid and radial pulses normal +2.    Diagnostic tests reviewed for today's visit:  No new labs or tests    ASSESSMENT  Medication and Non-Pharmacologic VTE Prophylaxis/Anticoagulants      VTE Prophylaxis: VTE prophylaxis appropriate    Impression: There is no known pertinent medical condition which may affect bobby-operative course        Clinical Risk Factors for Possible Cardiac Complications:  None    Patient is scheduled for a low-risk procedure.    FUNCTIONAL STATUS: Walk indoors, such as around the house (1.75 METs)    Functional Class (NYHA): N/A    [employer]: Not obtained    PLAN  CONSULTS:  Patient does not require consults for optimization at this time.    The Following Tests/Procedures Have Been Initiated:  None    Instructions Given to Patient:  Patient given verbal and written preop instructions and voices comprehension and compliance.    SIGNATURE: Armaan Richardson MD PATIENT NAME: Aayush CRENSHAW  DATE: June 2, 2025 MRN: [id number]  TIME: 9:12 AM PAGER/CONTACT #:    Electronically signed by Armaan Richardson MD at 06/02/2025 9:26 AM EDT   Source Comments  - [facility]   In the event this information is protected by the Federal Confidentiality of Alcohol and Drug Abuse Patient Records regulations: This information has been disclosed  to you from records protected by Federal confidentiality rules (42 CFR Part 2). The Federal rules prohibit you from making any further disclosure of this information unless further disclosure is expressly permitted by the written consent of the person to whom it pertains or as otherwise permitted by 42 CFR Part 2. A general authorization for the release of medical or other information is NOT sufficient for this purpose. The Federal rules restrict any use of the information to criminally investigate or prosecute any alcohol or drug abuse patient.  Reason for Visit    Reason for Visit -  Reason Comments   Cataract Follow Up       Encounter Details    Encounter Details  Date Type Department Care Team (Latest Contact Info) Description   06/02/2025 9:15 AM EDT Office Visit [facility] Ophthalmology   [address] [place] [address]   [phone]  Armaan Richardson MD   [address] [place] [address]   [phone] (Work)   [phone] (Fax)  Combined forms of age-related cataract of right eye (Primary Dx);  Combined forms of age-related cataract of left eye;  Meibomian gland dysfunction (MGD) of upper and lower lids of both eyes;  Punctate keratitis of both eyes;  Hx of LASIK;  Type 2 diabetes mellitus without retinopathy (HCC)     Social History  - documented as of this encounter   Social History  Tobacco Use Types Packs/Day Years Used Date   Smoking Tobacco: Never             Social History  Alcohol Use Standard Drinks/Week Comments   Not Asked 0 (1 standard drink = 0.6 oz pure alcohol)       Social History  Area Deprivation Index Answer Date Recorded   National Score (1-100), lower number is lower risk 57 05/07/2025   State Score (1-10), lower number is lower risk 3 05/07/2025   Data from: https://www.neighborhoodatlas.medicine.[facility].edu/. Last address used for calculation [address] [place] [address] [address] 05/07/2025     Social History  Pregnant Comments   No       Social History  Sex and Gender Information Value Date Recorded    Sex Assigned at Birth Not on file     Legal Sex Female 12/02/2012 7:33 AM EST   Gender Identity Not on file     Sexual Orientation Not on file       Last Filed Vital Signs  - documented in this encounter   Last Filed Vital Signs  Vital Sign Reading Time Taken Comments   Blood Pressure 149/88 06/02/2025 9:05 AM EDT     Pulse 72 06/02/2025 9:05 AM EDT     Temperature - -     Respiratory Rate - -     Oxygen Saturation - -     Inhaled Oxygen Concentration - -     Weight - -     Height - -     Body Mass Index - -       Patient Instructions  - documented in this encounter   Patient Instructions  Armaan Richardson MD - 06/02/2025 9:12 AM EDT   Formatting of this note is different from the original.  Current Ophthalmic Meds      fluorometholone (FML LIQUID FILM) 0.1 % ophthalmic suspension Use 1 drop in both eyes three times a day.  erythromycin (ROMYCIN) 5 mg/gram (0.5 %) ophthalmic ointment Use 1 application in both eyes daily at bedtime.    Continue:  Systane Complete solution instill 1 drop 3 times daily Both Eyes.  Warm compresses at bedtime Both Eyes.    Cataract surgery right eye scheduled on 6/19/25    If you have any questions please contact our office at [phone].  After office hours or on the weekend, please call Dr. Richardson on his cell phone at [phone].  Electronically signed by Armana Richardson MD at 06/02/2025 9:12 AM EDT     Progress Notes  - documented in this encounter   Armaan Richardson MD - 06/02/2025 9:09 AM EDT  Formatting of this note is different from the original.  ASSESSMENT/PLAN:  1. Combined forms of age-related cataract of right eye - ICD9: 366.19, ICD10: H25.811 (primary diagnosis)    PHYSICAL EXAM:    Vital Signs:  Blood pressure 149/88, pulse 72.    Respiratory:  Normal breath sounds, no wheezing.    CARD:  Normal heart sounds 1 & 2, normal sinus rhythm.      Cataract Presurgical Documentation    Cataract: Right eye (OD)    Current Visual Acuity  Right Eye Distance SC 20/50  Left  Eye Distance SC 20/40    Visual Function: Aayush CRENSHAW states that the decline in vision from the cataract impedes her abilities as listed in the HPI, as well as other activities of daily living.    Aayush CRENSHAW has confirmed that she is no longer able to function adequately on a day-to-day basis because of her current visual condition.    Further, it is my medical opinion that the cataract is the primary cause, or at least a significantly contributory cause of her visual dysfunction. With uncomplicated cataract surgery and lens implantation, it is my expectation that her visual function and quality of life will improve, significantly.    The risks, benefits, alternatives, personnel and complications of cataract surgery with lens implantation were discussed with Aayush CRENSHAW in detail. she appeared to understand and asked that I proceed with plans for surgery.    Upon eye examination, patient was found to have a visually significant cataract both eyes. Discussed cataract surgery with patient and different intraocular lens implant options with patient: basic monofocal intraocular lens implant, Toric intraocular lens implant, and presbyopia correction intraocular lens implant. In my medical opinion, based on medical history and ocular examination, cataract surgery with intraocular lens implant will correct patient's vision and improve quality of patient's daily living activities. Patient wishes to have traditional cataract surgery with basic intraocular lens right eye scheduled on 6/19/25. Patient wishes to have cataract surgery with the option stated above. Patient understands that an intraocular lens implant does not necessarily replace the need for glasses. Patient understands that it is impossible for the surgeon to inform him/her of every possible complication that may occur. The surgeon has answered all of the patient's questions. Patient understands that if he/she has a mature or dense cataract,  pseudoexfoliation cataract, or history of use of Flomax, he/she may require the use of Maluyugin Ring and/or Vision Blue during surgery. Patient understands the risks, benefits, and alternatives to surgery.    2. Combined forms of age-related cataract of left eye - ICD9: 366.19, ICD10: H25.812    - Plan cataract surgery left eye after right eye is completed and stable    3. Meibomian gland dysfunction (MGD) of upper and lower lids of both eyes - ICD9: 373.00, ICD10: H02.88A, H02.88B  4. Punctate keratitis of both eyes - ICD9: 370.21, ICD10: H16.143    - Continue    Current Ophthalmic Meds      fluorometholone (FML LIQUID FILM) 0.1 % ophthalmic suspension Use 1 drop in both eyes three times a day.  erythromycin (ROMYCIN) 5 mg/gram (0.5 %) ophthalmic ointment Use 1 application in both eyes daily at bedtime.    Warm compresses at bedtime both eyes  Systane Complete Artificial Tears - Use 1 Drop into both eyes three times a day.    5. Hx of LASIK - ICD9: V45.69, ICD10: Z98.890  - Laser in situ keratomileusis done in [place] 30+ years ago    6. Type 2 diabetes mellitus without retinopathy (HCC) - ICD9: 250.00, ICD10: E11.9    - Please keep your blood sugar under good control to minimize risk of ocular complications from diabetes.  - Continue to monitor with primary care physician.      I have confirmed and edited as necessary the relevant HPI, ophthalmic history, ROS, and the neuro exam findings as obtained by others. I have seen and examined Aayush CRENSHAW.  I have discussed the case and the management of this patient's care with the Resident/Fellow, if applicable. I also have reviewed and agree with the assessment and plan as stated above and agree with all of its relevant components.        Electronically signed by Armaan Richardson MD at 06/02/2025 9:26 AM EDT   Plan of Treatment  - documented as of this encounter   Plan of Treatment - Upcoming Encounters  Upcoming Encounters  Date Type Department Care Team  (Latest Contact Info) Description   06/20/2025 7:30 AM EDT Office Visit [facility] Ophthalmology   [address] [id number]   [phone]  Armaan Richardson MD   [address] [place] [address]   [phone] (Work)   [phone] (Fax)  1 day po 1st re     Plan of Treatment - Scheduled Procedures  Scheduled Procedures  Name Priority Associated Diagnoses Date/Time   SURGERY AT NON[facility] FACILITY   Combined forms of age-related cataract of right eye  06/19/2025 9:50 AM EDT     Procedures  - documented in this encounter   Procedures  Procedure Name Priority Date/Time Associated Diagnosis Comments   IOL BIOMETRY W/ IOL CALC OU (BOTH EYES) Routine 06/02/2025 9:26 AM EDT Combined forms of age-related cataract of right eye   Combined forms of age-related cataract of left eye  Results for this procedure are in the results section.      Imaging Results  - documented in this encounter   IOL BIOMETRY W/ IOL CALC OU (BOTH EYES) (06/02/2025 9:26 AM EDT)  Imaging Results - IOL BIOMETRY W/ IOL CALC OU (BOTH EYES) (06/02/2025 9:26 AM EDT)  Anatomical Region Laterality Modality       Other     Imaging Results - IOL BIOMETRY W/ IOL CALC OU (BOTH EYES) (06/02/2025 9:26 AM EDT)  Narrative   06/02/2025 9:26 AM EDT   Date of Procedure  6/2/2025.    Technician Information  Imaging Technician: AB.    Notes  Measurements only - see Procedure Record under Scanned Documents for  signed results.    LENSTAR  Right eye:  AL 25.08 ACD 3.13 WTW 12.25  Left eye:     AL 24.88 ACD 3.06 WTW 12.25       Imaging Results - IOL BIOMETRY W/ IOL CALC OU (BOTH EYES) (06/02/2025 9:26 AM EDT)  Authorizing Provider Result Type Result Status   Armaan Richardson MD OPHTHALMOLOGY Final Result     Associated Images       6/2/2025  IOL BIOMETRY W/ IOL CALC OU (BOTH EYES)     Visit Diagnoses  - documented in this encounter   Visit Diagnoses  Diagnosis   Combined forms of age-related cataract of right eye - Primary   Other and combined forms of senile  cataract    Combined forms of age-related cataract of left eye   Other and combined forms of senile cataract    Meibomian gland dysfunction (MGD) of upper and lower lids of both eyes    Punctate keratitis of both eyes   Punctate keratitis    Hx of LASIK   Other states following surgery of eye and adnexa    Type 2 diabetes mellitus without retinopathy (HCC)   Type II or unspecified type diabetes mellitus without mention of complication, not stated as uncontrolled      Eye Exam    Eye Exam - Visual Acuity (Snellen - Linear)  Visual Acuity (Snellen - Linear)    Right eye Left eye   Dist sc 20/50 20/40   Dist ph sc 20/20 20/20   Near cc J4 J4     Eye Exam - Tonometry (Applanation, 9:26 AM)  Tonometry (Applanation, 9:26 AM)    Right eye Left eye   Pressure 12 12     Eye Exam - Pupils  Pupils    Pupils Dark Shape React APD   Right eye PERRL 4 Round +2 None   Left eye PERRL 4 Round +2 None     Eye Exam - Visual Fields  Visual Fields    Right eye Left eye     Full Full     Eye Exam - Extraocular Movement  Extraocular Movement    Right eye Left eye     Full Full     Eye Exam - Neuro/Psych  Neuro/Psych  Oriented x3: Yes   Mood/Affect: Normal     Eye Exam - External Exam  External Exam    Right eye Left eye   External Normal including orbits and preauricular lymph nodes Normal including orbits and preauricular lymph nodes     Eye Exam - Slit Lamp Exam  Slit Lamp Exam    Right eye Left eye   Lids/Lashes Meibomian gland dysfunction Meibomian gland dysfunction   Conjunctiva/Sclera White and quiet White and quiet   Cornea Flap healed well, punctate epithelial keratitis Flap healed well, punctate epithelial keratitis   Anterior Chamber Deep and quiet Deep and quiet   Iris Round and reactive Iris atrophy at 3 o'clock   Lens 3+ Nuclear sclerotic cataract, 3+ Posterior subcapsular cataract 2+ Nuclear sclerotic cataract, 3+ Posterior subcapsular cataract   Anterior Vitreous Normal Normal     Eye Exam - Fundus Exam  Fundus Exam    Right  eye Left eye   Disc Normal Normal   C/D Ratio 0.1 0.1   Macula Normal Normal   Vessels Normal Normal   Periphery Normal Normal     Eye Exam - Wearing Rx  Wearing Rx    Add   Right eye +1.50   Left eye +1.50     Eye Exam  Type: OTC     Eye Exam - Manifest Refraction  Manifest Refraction    Sphere Cylinder Axis   Right eye -2.25 +0.75 003   Left eye +0.50 +0.75 020

## 2025-06-19 NOTE — OP NOTE
"PHACOEMULSIFICATION, CATARACT, WITH IOL INSERTION (R) Operative Note     Date: 2025  OR Location: [facility] OR    Name: Stacynce D Frames \"Linda\", : 1950, Age: 74 y.o., MRN: [id number], Sex: female    Diagnosis  Pre-op Diagnosis      * Combined forms of age-related cataract of right eye [H25.811] Post-op Diagnosis     * Combined forms of age-related cataract of right eye [H25.811]     Procedures  PHACOEMULSIFICATION, CATARACT, WITH IOL INSERTION  74305 - HI XCAPSL CTRC RMVL INSJ IO LENS PROSTH W/O ECP      Surgeons      * Armaan Richardson - Primary    Resident/Fellow/Other Assistant:  Surgeons and Role:  * No surgeons found with a matching role *    Staff:   Circulator: Alia Cruz Person: Jermaine    Anesthesia Staff: Anesthesiologist: Allan Rodgers MD    Procedure Summary  Anesthesia: Monitor Anesthesia Care  ASA: II  Estimated Blood Loss: None   Intra-op Medications: Administrations occurring from 0850 to 0930 on 25:  * No intraprocedure medications in log *    Anesthesia Record        Intraprocedure I/O Totals       None      Specimen: No specimens collected     Drains and/or Catheters: * None in log *    I have reviewed the images and report from the Ophthalmic Biometry 25 to determine the intraocular lens power calculation for the IOL lens implant.  I have interpreted and agree with the calculation of the IOL as listed below.    Implants:  Implants       Type Name Action Serial No.      Lens LENS, INTRAOCULAR, CCA0T0 21.5 - SSN [id number] - [id number] Implanted SN [id number]      Findings: Combined Form Age Related Cataract Right Eye     Indications: Trudence D Frames \"Linda\" is an 74 y.o. female who is having surgery for Combined forms of age-related cataract of right eye [H25.811]. Decreased vision right eye for near and distance.  Difficulty seeing for reading and watching TV right eye.  Vision in the right eye impedes patients ability to perform daily activities.     The patient was " seen in the preoperative area. The risks, benefits, complications, treatment options, non-operative alternatives, expected recovery and outcomes were discussed with the patient. The possibilities of reaction to medication, pulmonary aspiration, injury to surrounding structures, bleeding, recurrent infection, the need for additional procedures, failure to diagnose a condition, and creating a complication requiring transfusion or operation were discussed with the patient. The patient concurred with the proposed plan, giving informed consent.  The site of surgery was properly noted/marked if necessary per policy. The patient has been actively warmed in preoperative area. Preoperative antibiotics are not indicated. Venous thrombosis prophylaxis are not indicated.    Procedure Details: The patient was correctly identified and the patient's operative eye was marked with a marking pen and verified with the patient in the pre-operative area.   The operative eye was dilated in the preoperative area.  The patient was then taken to the operating room where timeout was performed before starting the procedure. Combined anesthesia  with intravenous sedation and topical tetracaine eyedrops were instilled into the right eye. The operative eye  was prepped and draped in the standard sterile ophthalmic fashion in  preparation for ophthalmic surgery.  A Preet wire speculum was then inserted between the eyelids of the right eye and the operating microscope was placed over the right eye.  A paracentesis incision was made approximately 30° away from the planned surgical incision site with the help of MVR blade.  1% lidocaine MPF with Phenylephrine 1.5% PF was injected into the anterior chamber through the paracentesis incision. A near limbal clear corneal incision was fashioned in the temporal quadrant just outside the vascular arcade and Viscoat was injected into anterior chamber to firm the eye. A bent needle cystotome was used and  Utrata forceps were utilized to create a continuous curvilinear capsulorrhexis.  BSS was injected beneath the anterior capsule to hydrodissect the nucleus from adjacent cortex and capsule.  The residual cortex was then aspirated with irrigation/aspiration handpiece. The posterior capsule was then polished with the help of soft irrigation-aspiration tip.  Provisc viscoelastic was then injected into the eye to reform the anterior chamber and to open the capsular bag.  The intraocular lens implant was taken from its sterile wrapping, inspected under the surgical microscope and found to be in good condition. The intraocular lens implant +21.5D was injected into the capsule bag. The Provisc was then aspirated from the anterior chamber and from behind the intraocular lens implant.  The anterior chamber was inflated with the help of BSS to moderate tension.  And the edges of the surgical incision were then hydrated with the help of BSS.  Vigamox was then injected into the anterior chamber and into the capsule bag through the paracentesis incision. The surgical wound was then inspected and found to be watertight.  The wire speculum and drapes were then removed.  Pred Forte eyedrops, Acular eyedrops and Betadine 5% sterile ophthalmic solution were instilled in the conjunctival sac.     The patient tolerated the procedure well and was taken to recovery room in stable condition.    Evidence of Infection: No   Complications:  None; patient tolerated the procedure well.    Disposition: Home  Condition: stable     Attending Attestation: I performed the procedure.    Armaan Richardson  Phone Number: [phone]

## 2025-06-19 NOTE — DISCHARGE INSTRUCTIONS
Please see enclosed instructions from Dr. Richardson regarding eye drop schedule, restrictions and use of eye shield.    Please take bag with eye drops that were given to you today as well as ALL eye drops that you are using at home with you to your appointment tomorrow at Dr. Richardson's office.

## 2025-06-19 NOTE — ANESTHESIA PREPROCEDURE EVALUATION
"Patient: Aayush D Rubi \"Linda\"    Procedure Information       Date/Time: 06/19/25 0850    Procedure: PHACOEMULSIFICATION, CATARACT, WITH IOL INSERTION (Right: Eye)    Location: [facility] OR 05 / Virtual [facility] OR    Surgeons: Armaan Richardson MD            Relevant Problems   Anesthesia (within normal limits)      Cardiac   (+) Benign hypertension   (+) Hyperlipidemia      Pulmonary   (+) Asthma, mild (HHS-HCC)      Neuro   (+) Anxiety      GI   (+) Gastroesophageal reflux disease without esophagitis      /Renal (within normal limits)      Liver (within normal limits)      Endocrine (within normal limits)      Hematology (within normal limits)      Musculoskeletal (within normal limits)      HEENT   (+) Seasonal allergies      ID   (+) Herpes simplex type 2 infection      Skin (within normal limits)      GYN (within normal limits)       Clinical information reviewed:   Tobacco  Allergies  Meds   Med Hx  Surg Hx  OB Status  Fam Hx  Soc   Hx        NPO Detail:  No data recorded     Physical Exam    Airway  Mallampati: II  TM distance: >3 FB  Neck ROM: full     Cardiovascular   Rhythm: regular  Rate: normal     Dental    Pulmonary Breath sounds clear to auscultation     Abdominal            Anesthesia Plan    History of general anesthesia?: yes  History of complications of general anesthesia?: no    ASA 2     MAC     The patient is not a current smoker.    intravenous induction   Anesthetic plan and risks discussed with patient.      "

## 2025-06-19 NOTE — ANESTHESIA POSTPROCEDURE EVALUATION
"Patient: Trbrandonnce D Frames \"Linda\"    Procedure Summary       Date: 06/19/25 Room / Location: [facility] OR  / [facility] OR    Anesthesia Start: 0830 Anesthesia Stop: 0850    Procedure: PHACOEMULSIFICATION, CATARACT, WITH IOL INSERTION (Right: Eye) Diagnosis:       Combined forms of age-related cataract of right eye      (Combined forms of age-related cataract of right eye [H25.811])    Surgeons: Armaan Richardson MD Responsible Provider: Allan Rodgers MD    Anesthesia Type: MAC ASA Status: 2            Anesthesia Type: MAC    Vitals Value Taken Time   BP  06/19/25 08:50   Temp  06/19/25 08:50   Pulse 80 06/19/25 08:50   Resp 12 06/19/25 08:50   SpO2 99 06/19/25 08:50       Anesthesia Post Evaluation    Patient location during evaluation: PACU  Patient participation: complete - patient participated  Level of consciousness: awake and alert  Pain score: 3  Pain management: adequate  Multimodal analgesia pain management approach  Airway patency: patent  Cardiovascular status: acceptable  Respiratory status: acceptable  Hydration status: acceptable  Postoperative Nausea and Vomiting: none        No notable events documented.    "

## 2025-07-17 ENCOUNTER — PREP FOR PROCEDURE (OUTPATIENT)
Dept: OPERATING ROOM | Facility: HOSPITAL | Age: 75
End: 2025-07-17
Payer: MEDICARE

## 2025-07-17 DIAGNOSIS — H25.812 COMBINED FORMS OF AGE-RELATED CATARACT OF LEFT EYE: Primary | ICD-10-CM

## 2025-07-17 RX ORDER — POVIDONE-IODINE 5 %
SOLUTION, NON-ORAL OPHTHALMIC (EYE) ONCE
OUTPATIENT
Start: 2025-07-17 | End: 2025-07-17

## 2025-07-17 RX ORDER — PREDNISOLONE ACETATE 10 MG/ML
1 SUSPENSION/ DROPS OPHTHALMIC ONCE
OUTPATIENT
Start: 2025-07-17 | End: 2025-07-17

## 2025-07-17 RX ORDER — KETOROLAC TROMETHAMINE 5 MG/ML
1 SOLUTION OPHTHALMIC
OUTPATIENT
Start: 2025-07-17 | End: 2025-07-17

## 2025-07-17 RX ORDER — TETRACAINE HYDROCHLORIDE 5 MG/ML
1 SOLUTION OPHTHALMIC ONCE
OUTPATIENT
Start: 2025-07-17 | End: 2025-07-17

## 2025-07-18 NOTE — PROGRESS NOTES
Patient ID: Linda Venegas is a 73 y.o. female.  Primary Care Provider: Deonna Guzman MD    Subjective    HPI: 71 y/o with vaginal dysplasia.     10/19 LSIL  6/20 LSIL, cannot exclude HSIL, HPV 16 positive, bx negative  8/20 VAIN 1  4/21 HSIL pap HPV 16 positive  10/11/21 laser ablation of vagina.    6/2022 PAP LSIL cannot rule out HSIL, HPV 16+, HPV non 16/18 +  10/23 HSIL, HPV16+.     PMH: HTN, HLD, T2DM, asthma, anxiety, arthritis  PSH: Breast biopsy, knee replacement, hysterectomy, tonsillectomy  Social history: Denies use of alcohol, tobacco and other drug use.       Objective  Visit Vitals  /75 (BP Location: Right arm, Patient Position: Sitting, BP Cuff Size: Adult)   Pulse 59   Temp 36.4 °C (97.5 °F) (Temporal)   Resp 16       Interval history:  Patient is a 74 y/o with vaginal dysplasia, persistent after laser ablation. Last pap 1/2025 was LGSIL, cannot rule out ASC-H, HPV 16+.  Colposcopy negative.  Started on Estrace.  Had cataract surgery right eye, left is getting done next.  Patient denies any vaginal bleeding, pink tinged discharge. Patient denies any constipation or diarrhea.  Appetite has been good.  Energy levels are baseline.  Patient denies hematuria.  Patient denies urinary leakage or incontinence. Patient is not currently sexually active.      Physical Exam:    Constitutional: Doing well.  Eyes: PERRL  ENMT: Moist mucus membranes  Head/Neck: Supple. Symmetrical  Gastrointestinal: Non-distended, soft, non-tender  Genitourinary: Patient denies any vaginal bleeding, pink tinged discharge. Patient denies any constipation or diarrhea.  Improving atrophic changes.  Appetite has been good.  Energy levels are baseline.  Patient denies hematuria.  Patient denies urinary leakage or incontinence.   Musculoskeletal: ROM intact, no joint swelling, normal strength  Extremities: No edema  Neurological: Alert and oriented x 3. Pleasant and cooperative.  Psychological: Appropriate mood and behavior  Skin:  Warm and dry, no lesions, no rashes    A complete review of systems was performed and all systems were normal except what is noted in the interval history.      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 Vaginal atrophy.     PLAN: F/U in 6 months or as needed  Physical examination was within normal limits today.  She is currently RAFAELA.  We reviewed signs and symptoms of possible recurrence with the patient and she will call our office should she experience any of these.

## 2025-07-22 ENCOUNTER — OFFICE VISIT (OUTPATIENT)
Dept: GYNECOLOGIC ONCOLOGY | Facility: CLINIC | Age: 75
End: 2025-07-22
Payer: MEDICARE

## 2025-07-22 VITALS
OXYGEN SATURATION: 95 % | HEART RATE: 59 BPM | SYSTOLIC BLOOD PRESSURE: 121 MMHG | RESPIRATION RATE: 16 BRPM | TEMPERATURE: 97.5 F | BODY MASS INDEX: 30.41 KG/M2 | WEIGHT: 199.96 LBS | DIASTOLIC BLOOD PRESSURE: 75 MMHG

## 2025-07-22 DIAGNOSIS — N89.3 VAGINAL DYSPLASIA: Primary | ICD-10-CM

## 2025-07-22 DIAGNOSIS — N95.2 VAGINAL ATROPHY: ICD-10-CM

## 2025-07-22 PROCEDURE — 1126F AMNT PAIN NOTED NONE PRSNT: CPT | Performed by: NURSE PRACTITIONER

## 2025-07-22 PROCEDURE — 3078F DIAST BP <80 MM HG: CPT | Performed by: NURSE PRACTITIONER

## 2025-07-22 PROCEDURE — 1036F TOBACCO NON-USER: CPT | Performed by: NURSE PRACTITIONER

## 2025-07-22 PROCEDURE — 99213 OFFICE O/P EST LOW 20 MIN: CPT | Performed by: NURSE PRACTITIONER

## 2025-07-22 PROCEDURE — 3074F SYST BP LT 130 MM HG: CPT | Performed by: NURSE PRACTITIONER

## 2025-07-22 PROCEDURE — 1159F MED LIST DOCD IN RCRD: CPT | Performed by: NURSE PRACTITIONER

## 2025-07-22 ASSESSMENT — PAIN SCALES - GENERAL: PAINLEVEL_OUTOF10: 0-NO PAIN

## 2025-07-24 ENCOUNTER — ANESTHESIA EVENT (OUTPATIENT)
Dept: OPERATING ROOM | Facility: HOSPITAL | Age: 75
End: 2025-07-24
Payer: MEDICARE

## 2025-07-24 ENCOUNTER — HOSPITAL ENCOUNTER (OUTPATIENT)
Facility: HOSPITAL | Age: 75
Setting detail: OUTPATIENT SURGERY
Discharge: HOME | End: 2025-07-24
Attending: OPHTHALMOLOGY | Admitting: OPHTHALMOLOGY
Payer: MEDICARE

## 2025-07-24 ENCOUNTER — ANESTHESIA (OUTPATIENT)
Dept: OPERATING ROOM | Facility: HOSPITAL | Age: 75
End: 2025-07-24
Payer: MEDICARE

## 2025-07-24 VITALS
DIASTOLIC BLOOD PRESSURE: 80 MMHG | BODY MASS INDEX: 30.2 KG/M2 | HEIGHT: 68 IN | HEART RATE: 61 BPM | RESPIRATION RATE: 17 BRPM | SYSTOLIC BLOOD PRESSURE: 172 MMHG | OXYGEN SATURATION: 100 % | TEMPERATURE: 97.3 F | WEIGHT: 199.3 LBS

## 2025-07-24 LAB — GLUCOSE BLD MANUAL STRIP-MCNC: 129 MG/DL (ref 74–99)

## 2025-07-24 PROCEDURE — 2720000007 HC OR 272 NO HCPCS: Performed by: OPHTHALMOLOGY

## 2025-07-24 PROCEDURE — 7100000009 HC PHASE TWO TIME - INITIAL BASE CHARGE: Performed by: OPHTHALMOLOGY

## 2025-07-24 PROCEDURE — 3600000008 HC OR TIME - EACH INCREMENTAL 1 MINUTE - PROCEDURE LEVEL THREE: Performed by: OPHTHALMOLOGY

## 2025-07-24 PROCEDURE — 2500000004 HC RX 250 GENERAL PHARMACY W/ HCPCS (ALT 636 FOR OP/ED): Performed by: ANESTHESIOLOGY

## 2025-07-24 PROCEDURE — 2760000001 HC OR 276 NO HCPCS: Performed by: OPHTHALMOLOGY

## 2025-07-24 PROCEDURE — 2500000001 HC RX 250 WO HCPCS SELF ADMINISTERED DRUGS (ALT 637 FOR MEDICARE OP): Performed by: OPHTHALMOLOGY

## 2025-07-24 PROCEDURE — 3600000003 HC OR TIME - INITIAL BASE CHARGE - PROCEDURE LEVEL THREE: Performed by: OPHTHALMOLOGY

## 2025-07-24 PROCEDURE — 82947 ASSAY GLUCOSE BLOOD QUANT: CPT

## 2025-07-24 PROCEDURE — 3700000001 HC GENERAL ANESTHESIA TIME - INITIAL BASE CHARGE: Performed by: OPHTHALMOLOGY

## 2025-07-24 PROCEDURE — 3700000002 HC GENERAL ANESTHESIA TIME - EACH INCREMENTAL 1 MINUTE: Performed by: OPHTHALMOLOGY

## 2025-07-24 PROCEDURE — V2632 POST CHMBR INTRAOCULAR LENS: HCPCS | Performed by: OPHTHALMOLOGY

## 2025-07-24 PROCEDURE — 2500000005 HC RX 250 GENERAL PHARMACY W/O HCPCS: Performed by: OPHTHALMOLOGY

## 2025-07-24 PROCEDURE — 7100000010 HC PHASE TWO TIME - EACH INCREMENTAL 1 MINUTE: Performed by: OPHTHALMOLOGY

## 2025-07-24 DEVICE — IMPLANTABLE DEVICE: Type: IMPLANTABLE DEVICE | Site: EYE | Status: FUNCTIONAL

## 2025-07-24 RX ORDER — SODIUM CHLORIDE, SODIUM LACTATE, POTASSIUM CHLORIDE, CALCIUM CHLORIDE 600; 310; 30; 20 MG/100ML; MG/100ML; MG/100ML; MG/100ML
100 INJECTION, SOLUTION INTRAVENOUS CONTINUOUS
Status: DISCONTINUED | OUTPATIENT
Start: 2025-07-24 | End: 2025-07-24 | Stop reason: HOSPADM

## 2025-07-24 RX ORDER — PREDNISOLONE ACETATE 10 MG/ML
1 SUSPENSION/ DROPS OPHTHALMIC ONCE
Status: DISCONTINUED | OUTPATIENT
Start: 2025-07-24 | End: 2025-07-24 | Stop reason: HOSPADM

## 2025-07-24 RX ORDER — POVIDONE-IODINE 5 %
SOLUTION, NON-ORAL OPHTHALMIC (EYE) ONCE
Status: COMPLETED | OUTPATIENT
Start: 2025-07-24 | End: 2025-07-24

## 2025-07-24 RX ORDER — TETRACAINE HYDROCHLORIDE 5 MG/ML
1 SOLUTION OPHTHALMIC ONCE
Status: COMPLETED | OUTPATIENT
Start: 2025-07-24 | End: 2025-07-24

## 2025-07-24 RX ORDER — MIDAZOLAM HYDROCHLORIDE 2 MG/2ML
1 INJECTION, SOLUTION INTRAMUSCULAR; INTRAVENOUS ONCE
Status: COMPLETED | OUTPATIENT
Start: 2025-07-24 | End: 2025-07-24

## 2025-07-24 RX ORDER — KETOROLAC TROMETHAMINE 5 MG/ML
1 SOLUTION OPHTHALMIC
Status: COMPLETED | OUTPATIENT
Start: 2025-07-24 | End: 2025-07-24

## 2025-07-24 RX ORDER — SODIUM CHLORIDE, SODIUM LACTATE, POTASSIUM CHLORIDE, CALCIUM CHLORIDE 600; 310; 30; 20 MG/100ML; MG/100ML; MG/100ML; MG/100ML
INJECTION, SOLUTION INTRAVENOUS CONTINUOUS PRN
Status: DISCONTINUED | OUTPATIENT
Start: 2025-07-24 | End: 2025-07-24

## 2025-07-24 RX ADMIN — KETOROLAC TROMETHAMINE 1 DROP: 5 SOLUTION/ DROPS OPHTHALMIC at 06:32

## 2025-07-24 RX ADMIN — POLYVINYL ALCOHOL, POVIDONE 1 DROP: 14; 6 SOLUTION/ DROPS OPHTHALMIC at 06:32

## 2025-07-24 RX ADMIN — KETOROLAC TROMETHAMINE 1 DROP: 5 SOLUTION/ DROPS OPHTHALMIC at 06:37

## 2025-07-24 RX ADMIN — KETOROLAC TROMETHAMINE 1 DROP: 5 SOLUTION/ DROPS OPHTHALMIC at 06:43

## 2025-07-24 RX ADMIN — POLYVINYL ALCOHOL, POVIDONE 1 DROP: 14; 6 SOLUTION/ DROPS OPHTHALMIC at 06:43

## 2025-07-24 RX ADMIN — POLYVINYL ALCOHOL, POVIDONE 1 DROP: 14; 6 SOLUTION/ DROPS OPHTHALMIC at 06:38

## 2025-07-24 RX ADMIN — POLYVINYL ALCOHOL, POVIDONE 1 DROP: 14; 6 SOLUTION/ DROPS OPHTHALMIC at 06:47

## 2025-07-24 RX ADMIN — TETRACAINE HYDROCHLORIDE 1 DROP: 5 SOLUTION OPHTHALMIC at 06:32

## 2025-07-24 RX ADMIN — PHENYLEPHRINE HYDROCHLORIDE 1 DROP: 100 SOLUTION/ DROPS OPHTHALMIC at 06:43

## 2025-07-24 RX ADMIN — PHENYLEPHRINE HYDROCHLORIDE 1 DROP: 100 SOLUTION/ DROPS OPHTHALMIC at 06:37

## 2025-07-24 RX ADMIN — SODIUM CHLORIDE, POTASSIUM CHLORIDE, SODIUM LACTATE AND CALCIUM CHLORIDE: 600; 310; 30; 20 INJECTION, SOLUTION INTRAVENOUS at 07:33

## 2025-07-24 RX ADMIN — MIDAZOLAM HYDROCHLORIDE 2 MG: 1 INJECTION, SOLUTION INTRAMUSCULAR; INTRAVENOUS at 07:31

## 2025-07-24 RX ADMIN — KETOROLAC TROMETHAMINE 1 DROP: 5 SOLUTION/ DROPS OPHTHALMIC at 06:47

## 2025-07-24 RX ADMIN — PHENYLEPHRINE HYDROCHLORIDE 1 DROP: 100 SOLUTION/ DROPS OPHTHALMIC at 06:47

## 2025-07-24 RX ADMIN — PHENYLEPHRINE HYDROCHLORIDE 1 DROP: 100 SOLUTION/ DROPS OPHTHALMIC at 06:32

## 2025-07-24 RX ADMIN — POVIDONE-IODINE: 5 SOLUTION OPHTHALMIC at 06:32

## 2025-07-24 SDOH — HEALTH STABILITY: MENTAL HEALTH: CURRENT SMOKER: 0

## 2025-07-24 ASSESSMENT — PAIN SCALES - GENERAL
PAIN_LEVEL: 3
PAINLEVEL_OUTOF10: 0 - NO PAIN

## 2025-07-24 ASSESSMENT — COLUMBIA-SUICIDE SEVERITY RATING SCALE - C-SSRS
2. HAVE YOU ACTUALLY HAD ANY THOUGHTS OF KILLING YOURSELF?: NO
1. IN THE PAST MONTH, HAVE YOU WISHED YOU WERE DEAD OR WISHED YOU COULD GO TO SLEEP AND NOT WAKE UP?: NO
6. HAVE YOU EVER DONE ANYTHING, STARTED TO DO ANYTHING, OR PREPARED TO DO ANYTHING TO END YOUR LIFE?: NO

## 2025-07-24 ASSESSMENT — PAIN - FUNCTIONAL ASSESSMENT: PAIN_FUNCTIONAL_ASSESSMENT: 0-10

## 2025-07-24 NOTE — H&P
H&P Notes  - documented in this encounter   Armaan Richardson MD - 07/01/2025 4:42 PM EDT  Formatting of this note is different from the original.  HISTORY AND PHYSICAL EXAMINATION    SERVICE DATE: 7/1/2025  SERVICE TIME: 4:45 PM    PRIMARY CARE PHYSICIAN: Deonna Guzman MD    REASON FOR VISIT:  Aayush CRENSHAW is a 74 year old female who is being seen for Combined Form Age Related Cataract Left Eye    The patient has the following:  ACTIVE PROBLEM LIST  Combined Forms of Age-Related Cataract of Right Eye  Combined Forms of Age-Related Cataract of Left Eye  Meibomian Gland Dysfunction (Mgd) of Upper and Lower Lids of Both Eyes  Punctate Keratitis of Both Eyes  Hx of Lasik  Type 2 Diabetes Mellitus Without Retinopathy (Hcc)    SUBJECTIVE  CHIEF COMPLAINT: Combined form age related cataract left eye. Blurred vision left eye for near and for distance. Difficulty seeing to watch TV and read. Vision left eye impedes patients ability to perform daily activities.    HPI: Combined form age related cataract left eye. Blurred vision left eye for near and for distance. Difficulty seeing to watch TV and read. Vision left eye impedes patients ability to perform daily activities.    PAST MEDICAL HISTORY  Diagnosis Date  Diabetes mellitus (HCC)  Essential hypertension  Generalized anxiety disorder  Hypercholesteremia    PAST SURGICAL HISTORY  Procedure Laterality Date  EXT HYSTERECTOMY,W/PARTIAL VAGINECTO  KERATOMILEUSIS Bilateral  [place]  REMV CATARACT EXTRACAP,INSERT LENS Right 06/19/2025  +21.5  TONSILLECTOMY HX  TOTAL KNEE REPLACEMENT Bilateral    FAMILY HISTORY  Problem Relation Age of Onset  No Ocular Disease No Family History    SOCIAL HISTORY:  Social History    Tobacco Use  Smoking status: Never    MEDICATIONS:  Prior to Admission medications as of 7/1/25 1634  Medication Sig Last Dose Taking  prednisoLONE acetate (PRED FORTE) 1 % ophthalmic suspension Use 1 drop in the right eye four times daily.  Yes  fluorometholone (FML LIQUID FILM) 0.1 % ophthalmic suspension Use 1 drop in the left eye three times a day. Yes  atorvastatin (LIPITOR) 10 mg tablet Take 10 mg by mouth once daily. Yes  estradiol (ESTRACE) 0.01 % (0.1 mg/gram) vaginal cream Use vaginally. Yes  metFORMIN (GLUCOPHAGE) 500 mg tablet Take 500 mg by mouth three times a day with meals. Yes  fluticasone (FLONASE) 50 mcg/actuation nasal spray Use 1 spray in the nose once daily. Yes  pantoprazole DR (PROTONIX) 40 mg tablet Take 40 mg by mouth. Yes  erythromycin (ROMYCIN) 5 mg/gram (0.5 %) ophthalmic ointment Use 1 application in both eyes daily at bedtime. Yes  Lovastatin 40 mg tablet Take 40 mg by mouth daily at bedtime. Yes  BISOPROLOL FUMARATE/HCTZ (BISOPROLOL-HYDROCHLOROTHIAZIDE ORAL) Take by mouth. Yes  citalopram 20 mg tablet Take 20 mg by mouth once daily. Yes  valACYclovir 500 mg tablet Take 500 mg by mouth twice daily. Yes  ibuprofen 800 mg tablet Take 800 mg by mouth every 8 hours as needed. Yes  HYDROCODONE BIT/ACETAMINOPHEN (HYDROCODONE-ACETAMINOPHEN ORAL) Take by mouth. Takes 500mg every 6 hours as needed Yes  CALCIUM CARBONATE (CALCIUM 500 ORAL) Take by mouth. Yes  multivitamin tablet Take 1 tablet by mouth once daily. Yes  ZOLPIDEM TARTRATE (AMBIEN ORAL) Take by mouth. Takes 1 at bedtime Yes  keTORolac (ACULAR) 0.5 % ophthalmic solution Use 1 drop in the right eye three times a day.    No medication comments found.    CURRENT ALLERGIES:  ALLERGIES  Allergen Reactions  Penicillins Unknown  Sulfa (Sulfonamide * Intolerance    REVIEW OF SYSTEMS:  PAIN ASSESSMENT:  General: No weight loss, malaise or fevers.  Neuro: No Hx of stroke or seizures  Respiratory: No history of current cough or dyspnea, or pneumonia in the past 6 weeks. No history of respiratory/pulmonary symptoms or problems  Cardiovascular: Positive for: Negative  GI: No history of GI symptoms or problems. No history of esophageal varices, recent ascites, or ETOH greater than 2  drinks per day.  : No history of UTI in past 6 weeks. No history of renal failure. Not currently on or requiring dialysis. No history of  symptoms or problems.  GYN: Negative for abnormal vaginal bleeding, abnormal vaginal discharge.  Pregnancy: N/A  Endocrine: No history of diabetes. Has not taken steroids within the past 30 days. No history of endocrinological symptoms or problems.  Hematology: No history of bleeding or clotting disorder. Pt is not taking anti-coagulation or platelet medications. No history of hematological symptoms or problems.  Oncology: No history of CA metastasis, chemo within 30 days, or radiotherapy within 90 days. Has not lost 10% of body wt in 6 months. No history of oncological symptoms or problems.  Psych: No history of psychiatric symptoms or problems.  Musculoskeletal: Negative for joint pain or swelling, back pain or muscle pain.  Skin: Negative for lesions, rash and itching.    PHYSICAL EXAM:  VITALS:  /88  Pulse 72        General: Alert and oriented  Skin: Normal color, no rash, no lesions.  HEENT: EOM, pupils equal, round and reactive.  Cardiovascular: Normal S1 & S2, no rubs, murmurs or gallops. No JVD. Pulse regular.  Lungs: Normal breath sounds, no wheezes or crackles.  Abdomen: Soft, non-tender, no rigidity.  Extremities: No deformity, no edema or tenderness, no joint swelling or clubbing.  Neurological: Normal cognition and motor skills.  Pulses: Carotid and radial pulses normal +2.    Diagnostic tests reviewed for today's visit:  All in Epic    ASSESSMENT  Medication and Non-Pharmacologic VTE Prophylaxis/Anticoagulants      VTE Prophylaxis: VTE prophylaxis appropriate    Impression: There is no known pertinent medical condition which may affect bobby-operative course        Clinical Risk Factors for Possible Cardiac Complications:  None    Patient is scheduled for a low-risk procedure.    FUNCTIONAL STATUS: Walk indoors, such as around the house (1.75 METs)  Do  light work around the house, such as dusting or washing dishes (2.70 METs)  Take care of self, that is eating, dressing, bathing, using the toilet (2.75 METs)    Functional Class (NYHA): N/A    [employer]: Not obtained    PLAN  CONSULTS:  Patient does not require consults for optimization at this time.    The Following Tests/Procedures Have Been Initiated:  None    Instructions Given to Patient:  Patient given verbal and written preop instructions and voices comprehension and compliance.    SIGNATURE: Armaan Richardson MD PATIENT NAME: Aayush CRENSHAW  DATE: July 1, 2025 MRN: [id number]  TIME: 4:42 PM PAGER/CONTACT #:    Electronically signed by Armaan Richardson MD at 07/01/2025 4:52 PM EDT   Source Comments  - [facility]   In the event this information is protected by the Federal Confidentiality of Alcohol and Drug Abuse Patient Records regulations: This information has been disclosed to you from records protected by Federal confidentiality rules (42 CFR Part 2). The Federal rules prohibit you from making any further disclosure of this information unless further disclosure is expressly permitted by the written consent of the person to whom it pertains or as otherwise permitted by 42 CFR Part 2. A general authorization for the release of medical or other information is NOT sufficient for this purpose. The Federal rules restrict any use of the information to criminally investigate or prosecute any alcohol or drug abuse patient.  Reason for Visit    Reason for Visit -  Reason Comments   Status post cataract surgery with IOL right eye     Blurred Vision Left Eye     Glare Left eye     Encounter Details    Encounter Details  Date Type Department Care Team (Latest Contact Info) Description   07/01/2025 4:30 PM EDT Office Visit [facility] Ophthalmology   [address] [place] [id number]   [phone]  Armaan Richardson MD   [address] [place] [address]   [phone] (Work)   [phone] (Fax)       [facility]   [address] [address]   [phone] (Work)   [phone] (Fax)  Combined forms of age-related cataract of left eye (Primary Dx);  Status post cataract extraction and insertion of intraocular lens of right eye     Social History  - documented as of this encounter   Social History  Tobacco Use Types Packs/Day Years Used Date   Smoking Tobacco: Never             Social History  Alcohol Use Standard Drinks/Week Comments   Not Asked 0 (1 standard drink = 0.6 oz pure alcohol)       Social History  Area Deprivation Index Answer Date Recorded   National Score (1-100), lower number is lower risk 57 06/20/2025   State Score (1-10), lower number is lower risk 3 06/20/2025   Data from: https://www.neighborhoodatlas.medicine.[facility].edu/. Last address used for calculation [address] [place] [address] 06/20/2025     Social History  Pregnant Comments   No       Social History  Sex and Gender Information Value Date Recorded   Sex Assigned at Birth Not on file     Legal Sex Female 12/02/2014 7:33 AM EST   Gender Identity Not on file     Sexual Orientation Not on file       Last Filed Vital Signs  - documented in this encounter   Last Filed Vital Signs  Vital Sign Reading Time Taken Comments   Blood Pressure 149/88 07/01/2025 4:33 PM EDT     Pulse 72 07/01/2025 4:33 PM EDT     Temperature - -     Respiratory Rate - -     Oxygen Saturation - -     Inhaled Oxygen Concentration - -     Weight - -     Height - -     Body Mass Index - -       Ordered Prescriptions  - documented in this encounter  Reconcile with Patient's Chart  Ordered Prescriptions  Prescription Sig Dispense Quantity Refills Last Filled Start Date End Date   keTORolac (ACULAR) 0.5 % ophthalmic solution  Use 1 drop in the right eye three times a day. 5 mL  1   07/01/2025     keTORolac (ACULAR) 0.5 % ophthalmic solution  Use 1 drop in the right eye three times a day. 5 mL  1   07/01/2025 07/01/2025     Progress Notes  - documented in  this encounter   Armaan Richardson MD - 2025 4:29 PM EDT  Formatting of this note is different from the original.  ASSESSMENT/PLAN:  1. Combined forms of age-related cataract of left eye - ICD9: 366.19, ICD10: H25.812 (primary diagnosis)    Current Ophthalmic Meds      fluorometholone (FML LIQUID FILM) 0.1 % ophthalmic suspension Use 1 drop in the left eye three times a day.  erythromycin (ROMYCIN) 5 mg/gram (0.5 %) ophthalmic ointment Use 1 application in both eyes daily at bedtime.    Cataract Presurgical Documentation    Cataract: Left eye (OS)    Current Visual Acuity:    Left Eye SC: 20/40    Glare Testin\80    Visual Function: Aayush CRENSHAW states that the decline in vision from the cataract impedes her abilities as listed in the HPI, as well as other activities of daily living.    Aayush CRENSHAW has confirmed that she is no longer able to function adequately on a day-to-day basis because of her current visual condition.    Further, it is my medical opinion that the cataract is the primary cause, or at least a significantly contributory cause of her visual dysfunction. With uncomplicated cataract surgery and lens implantation, it is my expectation that her visual function and quality of life will improve, significantly.    The risks, benefits, alternatives, personnel and complications of cataract surgery with lens implantation were discussed with Aayush CRENSHAW in detail. she appeared to understand and asked that I proceed with plans for surgery.    Upon eye examination, patient was found to have a visually significant cataract left. Discussed cataract surgery with patient and different intraocular lens implant options with patient: basic monofocal intraocular lens implant, Toric intraocular lens implant, and presbyopia correction intraocular lens implant. In my medical opinion, based on medical history and ocular examination, cataract surgery with intraocular lens implant will correct  patient's vision and improve quality of patient's daily living activities. Patient wishes to have traditional cataract surgery with basic intraocular lens left. Patient wishes to have cataract surgery with the option stated above. Patient understands that an intraocular lens implant does not necessarily replace the need for glasses. Patient understands that it is impossible for the surgeon to inform him/her of every possible complication that may occur. The surgeon has answered all of the patient's questions. Patient understands that if he/she has a mature or dense cataract, pseudoexfoliation cataract, or history of use of Flomax, he/she may require the use of Maluyugin Ring and/or Vision Blue during surgery. Patient understands the risks, benefits, and alternatives to surgery.    Patient is scheduled for cataract surgery with Intraocular lens left eye on July 24, 2025 at [facility]    PHYSICAL EXAM:    Vital Signs:  Blood pressure 149/88, pulse 72.    Respiratory:  Normal breath sounds, no wheezing.    CARD:  Normal heart sounds 1 & 2, normal sinus rhythm.    2. Status post cataract extraction and insertion of intraocular lens of right eye - ICD9: V45.61, V43.1, ICD10: Z98.41, Z96.1    Current Ophthalmic Meds      keTORolac (ACULAR) 0.5 % ophthalmic solution Use 1 drop in the right eye three times a day.  prednisoLONE acetate (PRED FORTE) 1 % ophthalmic suspension Use 1 drop in the right eye four times daily.    valACYclovir 500 mg tablet    Continue:  Systane Complete solution instill 1 drop 3 times daily Both Eyes.    I have confirmed and edited as necessary the relevant HPI, ophthalmic history, ROS, and the neuro exam findings as obtained by others. I have seen and examined Aayush CRENSHAW.  I have discussed the case and the management of this patient's care with the Resident/Fellow, if applicable. I also have reviewed and agree with the assessment and plan as stated above and agree  with all of its relevant components.        Electronically signed by Armaan Richardson MD at 07/01/2025 4:52 PM EDT   Plan of Treatment  - documented as of this encounter   Plan of Treatment - Upcoming Encounters  Upcoming Encounters  Date Type Department Care Team (Latest Contact Info) Description   07/25/2025 11:00 AM EDT Office Visit [facility] Ophthalmology   [address] [place] [address]   [phone]  Armaan Richardson MD   [address] [place] [address]   [phone] (Work)   [phone] (Fax)      [facility]   [address] [id number]   [phone] (Work)   [phone] (Fax)  1 DAY PO 2ND LE     Plan of Treatment - Scheduled Procedures  Scheduled Procedures  Name Priority Associated Diagnoses Date/Time   SUR[facility]ERY AT [facility]   Combined forms of age-related cataract of left eye  07/24/2025 7:50 AM EDT     Visit Diagnoses  - documented in this encounter   Visit Diagnoses  Diagnosis   Combined forms of age-related cataract of left eye - Primary   Other and combined forms of senile cataract    Status post cataract extraction and insertion of intraocular lens of right eye      Discontinued Medications  - documented as of this encounter   Discontinued Medications  Medication Sig Discontinue Reason Start Date End Date   keTORolac (ACULAR) 0.5 % ophthalmic solution  Use 1 drop in the right eye four times daily. Adjust Sig - Block E-Cancel 06/24/2025 07/01/2025   keTORolac (ACULAR) 0.5 % ophthalmic solution  Use 1 drop in the right eye three times a day. Adjust Sig - Block E-Cancel 07/01/2025 07/01/2025     Eye Exam    Eye Exam - Visual Acuity (Snellen - Linear)  Visual Acuity (Snellen - Linear)    Right eye Left eye   Dist sc 20/20 20/40     Eye Exam - Pupils  Pupils    Pupils Dark Light Shape APD   Right eye PERRL 4 2 Round None   Left eye PERRL 4 2 Round None     Eye Exam - Visual Fields (Counting fingers)  Visual Fields (Counting fingers)    Right eye Left eye      Full Full     Eye Exam - Extraocular Movement  Extraocular Movement    Right eye Left eye     Full Full     Eye Exam - Neuro/Psych  Neuro/Psych  Oriented x3: Yes   Mood/Affect: Normal     Eye Exam - External Exam  External Exam    Right eye Left eye   External Normal including orbits and preauricular lymph nodes Normal including orbits and preauricular lymph nodes     Eye Exam - Slit Lamp Exam  Slit Lamp Exam    Right eye Left eye   Lids/Lashes Normal lids, lashes, lacrimal glands, and lacrimal drainage Normal lids, lashes, lacrimal glands, and lacrimal drainage   Conjunctiva/Sclera White and quiet White and quiet   Cornea Wound edges well opposed Flap healed well, punctate epithelial keratitis   Anterior Chamber Deep and quiet Deep and quiet   Iris Mid-dilated Iris atrophy at 3 o'clock   Lens Posterior chamber intraocular lens 2+ Nuclear sclerotic cataract, 3+ Posterior subcapsular cataract   Anterior Vitreous Normal Normal     Eye Exam - Fundus Exam  Fundus Exam    Right eye Left eye   Disc Normal Normal   C/D Ratio 0.1 0.1   Macula Normal Normal   Vessels Normal Normal   Periphery Normal Normal     Care Teams  - documented as of this encounter   Care Teams  Team Member Relationship Specialty Start Date End Date   Deonna Guzman MD   NPI: [id number]   [address] [place] [address]   [phone] (Work)   [phone] (Fax)  PCP - General Family Medicine 4/11/25

## 2025-07-24 NOTE — OP NOTE
"PHACOEMULSIFICATION, CATARACT, WITH IOL INSERTION (L) Operative Note     Date: 2025  OR Location: [facility] OR    Name: Aayush D Rubi \"Linda\", : 1950, Age: 75 y.o., MRN: [id number], Sex: female    Diagnosis  Pre-op Diagnosis      * Combined forms of age-related cataract of left eye [H25.812] Post-op Diagnosis     * Combined forms of age-related cataract of left eye [H25.812]     Procedures  PHACOEMULSIFICATION, CATARACT, WITH IOL INSERTION  00394 - LA XCAPSL CTRC RMVL INSJ IO LENS PROSTH W/O ECP      Surgeons      * Armaan Richardson - Primary    Resident/Fellow/Other Assistant:  Surgeons and Role:  * No surgeons found with a matching role *    Staff:   Circulator: Alia Cruz Person: Arvind    Anesthesia Staff: Anesthesiologist: Allan Rodgers MD    Procedure Summary  Anesthesia: Monitor Anesthesia Care  ASA: II  Estimated Blood Loss: None   Intra-op Medications:   Administrations occurring from 0730 to 0810 on 25:   Medication Name Total Dose   lactated Ringer's infusion Cannot be calculated   midazolam (Versed) injection 1 mg 2 mg   LR infusion 28.44 mL     Anesthesia Record        Intraprocedure I/O Totals       None      Specimen: No specimens collected     Drains and/or Catheters: * None in log *    I have reviewed the images and report from the Ophthalmic Biometry 25 to determine the intraocular lens power calculation for the IOL lens implant.  I have interpreted and agree with the calculation of the IOL as listed below.    Implants:  Implants       Type Name Action Serial No.      Lens LENS, INTRAOCULAR, CCA0T0 21.5 - SSN [id number] - [id number] Implanted SN [id number]      Findings:  Combined Form Age Related Cataract Left Eye     Indications: Trudencmarlon D Frames \"Linda\" is an 75 y.o. 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 Decreased vision in the left eye for near and for distance.  Difficulty seeing to read and watch TV, vision " left eye impedes patients ability to perform daily tasks.     The patient was seen in the preoperative area. The risks, benefits, complications, treatment options, non-operative alternatives, expected recovery and outcomes were discussed with the patient. The possibilities of reaction to medication, pulmonary aspiration, injury to surrounding structures, bleeding, recurrent infection, the need for additional procedures, failure to diagnose a condition, and creating a complication requiring transfusion or operation were discussed with the patient. The patient concurred with the proposed plan, giving informed consent.  The site of surgery was properly noted/marked if necessary per policy. The patient has been actively warmed in preoperative area. Preoperative antibiotics are not indicated. Venous thrombosis prophylaxis are not indicated.    Procedure Details: The patient was correctly identified in the preop area and the operative eye was marked with a marking pen. The operative eye was dilated in the preoperative area.  The patient was then taken to the operating room where timeout was performed before starting the procedure. Combined anesthesia  with intravenous sedation and topical tetracaine eyedrops were given the left eye. The operative eye was prepped and draped in the standard sterile ophthalmic fashion in  preparation for ophthalmic surgery.  A Preet wire speculum was then inserted between the eyelids of the left eye and the operating microscope was placed over the left eye.  A paracentesis incision was made approximately 30° away from the planned surgical incision site with the help of MVR blade.  1% lidocaine MPF with Phenylephrine 1.5% PF was injected into the anterior chamber through the paracentesis incision. A near limbal clear corneal incision was fashioned in the temporal quadrant just outside the vascular arcade and Viscoat was injected into anterior chamber to firm the eye. A bent needle  cystotome was used and Utrata forceps were utilized to create a continuous curvilinear capsulorrhexis.  BSS was injected beneath the anterior capsule to hydrodissect the nucleus from adjacent cortex and capsule.  The nucleus of the cataractous lens was removed with phacoemulsification instrument. The residual cortex were then aspirated with irrigation aspiration handpiece. The posterior capsule was then polished with the help of soft irrigation-aspiration tip.  Provisc viscoelastic was then injected into the eye to reform the anterior chamber and to open the capsular bag.  The intraocular lens implant was taken from its sterile wrapping, inspected under the surgical microscope and found to be in good condition. The intraocular lens implant +21.5D was injected into the capsule bag. The Provisc was then aspirated from the anterior chamber and from behind the intraocular lens implant.  The anterior chamber was inflated with the help of BSS to moderate tension.  The edges of the surgical incision were then hydrated with the help of BSS.  Vigamox was then injected into the anterior chamber and into the capsule bag through the paracentesis incision. The surgical wound was then inspected and found to be watertight.  The wire speculum and drapes were then removed.  Pred Forte eyedrops, Acular eyedrops and Betadine 5% sterile ophthalmic solution were instilled in the conjunctival sac.     The patient tolerated the procedure well and was taken to recovery room in stable condition.    Evidence of Infection: No   Complications:  None; patient tolerated the procedure well.    Disposition: Home  Condition: stable     Attending Attestation: I performed the procedure.    Armaan Richardson  Phone Number: [phone]

## 2025-07-24 NOTE — ANESTHESIA PREPROCEDURE EVALUATION
"Patient: Kendrae D Frames \"Linda\"    Procedure Information       Date/Time: 07/24/25 0730    Procedure: PHACOEMULSIFICATION, CATARACT, WITH IOL INSERTION (Left: Eye)    Location: [facility] OR  / [facility]    Surgeons: Armaan Richardson MD            Relevant Problems   Anesthesia (within normal limits)      Cardiac   (+) Benign hypertension   (+) Hyperlipidemia      Pulmonary   (+) Asthma, mild (HHS-HCC)      Neuro   (+) Anxiety      GI   (+) Gastroesophageal reflux disease without esophagitis      /Renal (within normal limits)      Liver (within normal limits)      Endocrine (within normal limits)      Hematology (within normal limits)      Musculoskeletal (within normal limits)      HEENT   (+) Seasonal allergies      ID   (+) Herpes simplex type 2 infection      Skin (within normal limits)      GYN (within normal limits)       Clinical information reviewed:   Tobacco  Allergies  Meds  Problems  Med Hx  Surg Hx  OB Status    Fam Hx  Soc Hx        NPO Detail:  NPO/Void Status  Carbohydrate Drink Given Prior to Surgery? : N  Date of Last Liquid: 07/23/25  Time of Last Liquid: 1830  Date of Last Solid: 07/23/25  Time of Last Solid: 1830  Last Intake Type: Clear fluids  Time of Last Void: 0631         Physical Exam    Airway  Mallampati: II  TM distance: >3 FB  Neck ROM: full     Cardiovascular   Rhythm: regular  Rate: normal     Dental - normal exam     Pulmonary Breath sounds clear to auscultation     Abdominal            Anesthesia Plan    History of general anesthesia?: yes  History of complications of general anesthesia?: no    ASA 2     MAC     The patient is not a current smoker.    intravenous induction   Anesthetic plan and risks discussed with patient.      "

## 2025-07-24 NOTE — ANESTHESIA POSTPROCEDURE EVALUATION
"Patient: Trbrandonnce D Frames \"Linda\"    Procedure Summary       Date: 07/24/25 Room / Location: [facility] OR  / [facility] OR    Anesthesia Start: 0728 Anesthesia Stop: 0750    Procedure: PHACOEMULSIFICATION, CATARACT, WITH IOL INSERTION (Left: Eye) Diagnosis:       Combined forms of age-related cataract of left eye      (Combined forms of age-related cataract of left eye [H25.812])    Surgeons: Armaan Richardson MD Responsible Provider: Allan Rodgers MD    Anesthesia Type: MAC ASA Status: 2            Anesthesia Type: MAC    Vitals Value Taken Time   BP  07/24/25 07:50   Temp  07/24/25 07:50   Pulse 70 07/24/25 07:50   Resp 12 07/24/25 07:50   SpO2 99 07/24/25 07:50       Anesthesia Post Evaluation    Patient location during evaluation: PACU  Patient participation: complete - patient participated  Level of consciousness: awake and alert  Pain score: 3  Pain management: adequate  Multimodal analgesia pain management approach  Airway patency: patent  Cardiovascular status: acceptable  Respiratory status: acceptable  Hydration status: acceptable  Postoperative Nausea and Vomiting: none        No notable events documented.    "

## 2025-08-14 DIAGNOSIS — Z00.00 HEALTHCARE MAINTENANCE: ICD-10-CM

## 2025-08-15 RX ORDER — PANTOPRAZOLE SODIUM 40 MG/1
40 TABLET, DELAYED RELEASE ORAL DAILY
Qty: 90 TABLET | Refills: 1 | Status: SHIPPED | OUTPATIENT
Start: 2025-08-15

## 2025-10-03 ENCOUNTER — APPOINTMENT (OUTPATIENT)
Age: 75
End: 2025-10-03
Payer: MEDICARE

## (undated) DEVICE — Device